# Patient Record
Sex: FEMALE | Race: WHITE | Employment: OTHER | URBAN - METROPOLITAN AREA
[De-identification: names, ages, dates, MRNs, and addresses within clinical notes are randomized per-mention and may not be internally consistent; named-entity substitution may affect disease eponyms.]

---

## 2017-08-18 ENCOUNTER — HOSPITAL ENCOUNTER (OUTPATIENT)
Dept: MAMMOGRAPHY | Age: 72
Discharge: HOME | End: 2017-08-18
Payer: MEDICARE

## 2017-08-18 DIAGNOSIS — Z12.31 SCREENING MAMMOGRAM, ENCOUNTER FOR: ICD-10-CM

## 2017-08-18 PROCEDURE — 77063 BREAST TOMOSYNTHESIS BI: CPT

## 2018-12-03 ENCOUNTER — OFFICE VISIT (OUTPATIENT)
Dept: ORTHOPEDIC SURGERY | Age: 73
End: 2018-12-03

## 2018-12-03 VITALS
HEIGHT: 65 IN | BODY MASS INDEX: 25.66 KG/M2 | WEIGHT: 154 LBS | HEART RATE: 73 BPM | SYSTOLIC BLOOD PRESSURE: 87 MMHG | DIASTOLIC BLOOD PRESSURE: 65 MMHG

## 2018-12-03 DIAGNOSIS — Z96.652 HISTORY OF TOTAL KNEE ARTHROPLASTY, LEFT: Primary | ICD-10-CM

## 2018-12-03 PROCEDURE — 99024 POSTOP FOLLOW-UP VISIT: CPT | Performed by: ORTHOPAEDIC SURGERY

## 2018-12-03 RX ORDER — ASPIRIN 81 MG/1
81 TABLET, CHEWABLE ORAL EVERY OTHER DAY
COMMUNITY

## 2018-12-03 RX ORDER — ATORVASTATIN CALCIUM 40 MG/1
40 TABLET, FILM COATED ORAL DAILY
COMMUNITY
Start: 2017-10-01

## 2018-12-03 RX ORDER — SUMATRIPTAN 100 MG/1
100 TABLET, FILM COATED ORAL
COMMUNITY
Start: 2018-08-31

## 2018-12-03 RX ORDER — OMEPRAZOLE 20 MG/1
20 CAPSULE, DELAYED RELEASE ORAL DAILY
Status: ON HOLD | COMMUNITY
Start: 2018-10-20 | End: 2020-11-03

## 2018-12-03 RX ORDER — SERTRALINE HYDROCHLORIDE 25 MG/1
100 TABLET, FILM COATED ORAL DAILY
COMMUNITY
End: 2020-09-25

## 2018-12-03 RX ORDER — LISINOPRIL AND HYDROCHLOROTHIAZIDE 20; 12.5 MG/1; MG/1
1 TABLET ORAL DAILY
COMMUNITY
Start: 2017-08-23

## 2018-12-03 ASSESSMENT — ENCOUNTER SYMPTOMS
EYES NEGATIVE: 1
GASTROINTESTINAL NEGATIVE: 1
RESPIRATORY NEGATIVE: 1

## 2019-01-09 ENCOUNTER — OFFICE VISIT (OUTPATIENT)
Dept: ORTHOPEDIC SURGERY | Age: 74
End: 2019-01-09

## 2019-01-09 VITALS
SYSTOLIC BLOOD PRESSURE: 142 MMHG | BODY MASS INDEX: 26.42 KG/M2 | DIASTOLIC BLOOD PRESSURE: 81 MMHG | HEIGHT: 65 IN | HEART RATE: 72 BPM | WEIGHT: 158.6 LBS

## 2019-01-09 DIAGNOSIS — Z96.652 HISTORY OF TOTAL KNEE ARTHROPLASTY, LEFT: Primary | ICD-10-CM

## 2019-01-09 PROCEDURE — 99024 POSTOP FOLLOW-UP VISIT: CPT | Performed by: PHYSICIAN ASSISTANT

## 2019-01-09 ASSESSMENT — ENCOUNTER SYMPTOMS
DIARRHEA: 0
ABDOMINAL DISTENTION: 0
SHORTNESS OF BREATH: 0
ABDOMINAL PAIN: 0
COLOR CHANGE: 0
VOMITING: 0
COUGH: 0
CHEST TIGHTNESS: 0
CONSTIPATION: 0
APNEA: 0
NAUSEA: 0

## 2019-02-28 ENCOUNTER — HOSPITAL ENCOUNTER (OUTPATIENT)
Dept: MAMMOGRAPHY | Age: 74
Discharge: HOME | End: 2019-03-02
Payer: MEDICARE

## 2019-02-28 DIAGNOSIS — Z12.39 BREAST CANCER SCREENING: ICD-10-CM

## 2019-02-28 DIAGNOSIS — M85.869 OSTEOPENIA OF LOWER LEG, UNSPECIFIED LATERALITY: ICD-10-CM

## 2019-02-28 PROCEDURE — 77063 BREAST TOMOSYNTHESIS BI: CPT

## 2019-02-28 PROCEDURE — 77080 DXA BONE DENSITY AXIAL: CPT

## 2020-07-18 ENCOUNTER — HOSPITAL ENCOUNTER (OUTPATIENT)
Dept: MAMMOGRAPHY | Age: 75
Discharge: HOME | End: 2020-07-20
Payer: MEDICARE

## 2020-07-18 PROCEDURE — 77067 SCR MAMMO BI INCL CAD: CPT

## 2020-07-30 ENCOUNTER — HOSPITAL ENCOUNTER (OUTPATIENT)
Dept: MAMMOGRAPHY | Age: 75
Discharge: HOME | End: 2020-08-01
Payer: MEDICARE

## 2020-07-30 ENCOUNTER — HOSPITAL ENCOUNTER (OUTPATIENT)
Dept: ULTRASOUND IMAGING | Age: 75
Discharge: HOME | End: 2020-08-01
Payer: MEDICARE

## 2020-07-30 PROCEDURE — 76642 ULTRASOUND BREAST LIMITED: CPT

## 2020-07-30 PROCEDURE — 77065 DX MAMMO INCL CAD UNI: CPT

## 2020-08-06 ENCOUNTER — HOSPITAL ENCOUNTER (OUTPATIENT)
Dept: ULTRASOUND IMAGING | Age: 75
Discharge: HOME | End: 2020-08-08
Payer: MEDICARE

## 2020-08-06 ENCOUNTER — HOSPITAL ENCOUNTER (OUTPATIENT)
Dept: MAMMOGRAPHY | Age: 75
Discharge: HOME | End: 2020-08-08
Payer: MEDICARE

## 2020-08-06 VITALS — DIASTOLIC BLOOD PRESSURE: 75 MMHG | HEART RATE: 64 BPM | SYSTOLIC BLOOD PRESSURE: 137 MMHG

## 2020-08-06 PROCEDURE — 88305 TISSUE EXAM BY PATHOLOGIST: CPT

## 2020-08-06 PROCEDURE — 88341 IMHCHEM/IMCYTCHM EA ADD ANTB: CPT

## 2020-08-06 PROCEDURE — 88360 TUMOR IMMUNOHISTOCHEM/MANUAL: CPT

## 2020-08-06 PROCEDURE — 19084 BX BREAST ADD LESION US IMAG: CPT

## 2020-08-06 PROCEDURE — A4648 IMPLANTABLE TISSUE MARKER: HCPCS

## 2020-08-06 PROCEDURE — 19083 BX BREAST 1ST LESION US IMAG: CPT

## 2020-08-06 PROCEDURE — 88342 IMHCHEM/IMCYTCHM 1ST ANTB: CPT

## 2020-08-06 PROCEDURE — 88377 M/PHMTRC ALYS ISHQUANT/SEMIQ: CPT

## 2020-08-06 PROCEDURE — 2500000003 HC RX 250 WO HCPCS

## 2020-08-11 LAB — SURGICAL PATHOLOGY REPORT: NORMAL

## 2020-08-13 ENCOUNTER — TELEPHONE (OUTPATIENT)
Dept: ONCOLOGY | Age: 75
End: 2020-08-13

## 2020-08-13 NOTE — TELEPHONE ENCOUNTER
Notified of patient to navigate per pathology list.  Contacted Dr. Alfonso Gutiérrez office and patient does not have follow up with Dr. Rosalba Salgado at this time for results. Dr. Alfonso Gutiérrez office does have pathology report. Pt has been referred to Dr. Tip Montanez. I spoke with Dr. Mckay Washburn office and patient is on the schedule to be seen 9/3/20. Navigation letter faxed to Dr. Alfonso Gutiérrez office, Navigation letter sent per epic to Dr. Tip Montanez. I let staff know that I am available to assist Dr. Tip Montanez and patient as needed. I will reach out to patient after her appointment with Dr. Tip Montanez next week.

## 2020-09-15 ENCOUNTER — TELEPHONE (OUTPATIENT)
Dept: ONCOLOGY | Age: 75
End: 2020-09-15

## 2020-09-15 NOTE — TELEPHONE ENCOUNTER
Patient has had consultation with surgeon and is scheduled for left breast lumpectomy with [facility] for 10/6/20. I called Oscar Vines and left message introducing myself as her oncology nurse navigator. Explained that I am here as a resource, support person, coordinate care as needed, answer questions and help remove any barriers to care. I left her my name and contact number. I encouraged her to call as needed. Let her know I will follow along with her care. Navigation letter mailed out to Oscar Vines. Pt on pending.

## 2020-09-21 ENCOUNTER — TELEPHONE (OUTPATIENT)
Dept: ONCOLOGY | Age: 75
End: 2020-09-21

## 2020-09-21 NOTE — TELEPHONE ENCOUNTER
I called and spoke with Igor Or today. I introduced myself as her oncology nurse navigator at [facility] ([facility]). She relates that she received my information and my call. I let her know I am here to provide assistance and support for her through her treatment. I am here to help coordinate appointments, answer questions, talk about concerns or road blocks to care. Confirmed with her my contact number. I asked if she has any questions about upcoming surgery 10/9/20. She relates she is ready to get it done, and she does not know what she should ask. We talked and she ended up asking some very good questions. We talked about radiation therapy. I explained that she will be referred to a radiation oncologist.  Michelle Sings many questions about regarding xrt. We talked about when it would start, frequency, length of time, side effects, ways to reduce side effects. I let her know that she may also see a medical oncologist due to her er/pr+ status. Explained that when a patient has er/pr+ status one tool for treatment/prevention of another cancer is endocrine therapy. She would be a candidate for endocrine therapy. I let her know that after her surgery she will follow up with her surgeon and he will refer her to radiation oncologist for radiation therapy discussion, and medical oncologist for endocrine therapy/ chemotherapy. Explained that sometimes chemotherapy is needed but not for everyone. I let her know if there is reason for chemotherapy her medical oncologist would talk to her about that. We discussed Deep inspiratory breath hold technique that may be a benefit for left sided breast cancer patients when they get radiation therapy. We discussed post op recovery. I offered to send her [facility] booklets on radiation therapy and breast conserving surgery and she was appreciative of the offer and would like to see them. Mailed booklets. Pt on pending.

## 2020-09-25 ENCOUNTER — HOSPITAL ENCOUNTER (OUTPATIENT)
Dept: PREADMISSION TESTING | Age: 75
Discharge: HOME | End: 2020-09-29
Payer: MEDICARE

## 2020-09-25 VITALS
DIASTOLIC BLOOD PRESSURE: 82 MMHG | OXYGEN SATURATION: 97 % | RESPIRATION RATE: 18 BRPM | SYSTOLIC BLOOD PRESSURE: 152 MMHG | HEIGHT: 66 IN | WEIGHT: 174.16 LBS | TEMPERATURE: 97.5 F | HEART RATE: 77 BPM | BODY MASS INDEX: 27.99 KG/M2

## 2020-09-25 LAB
ABSOLUTE EOS #: 0.24 K/UL (ref 0–0.44)
ABSOLUTE IMMATURE GRANULOCYTE: 0.04 K/UL (ref 0–0.3)
ABSOLUTE LYMPH #: 1.63 K/UL (ref 1.1–3.7)
ABSOLUTE MONO #: 0.61 K/UL (ref 0.1–1.2)
ANION GAP SERPL CALCULATED.3IONS-SCNC: 11 MMOL/L (ref 9–17)
BASOPHILS # BLD: 1 % (ref 0–2)
BASOPHILS ABSOLUTE: 0.1 K/UL (ref 0–0.2)
BUN BLDV-MCNC: 16 MG/DL (ref 8–23)
CHLORIDE BLD-SCNC: 105 MMOL/L (ref 98–107)
CO2: 24 MMOL/L (ref 20–31)
CREAT SERPL-MCNC: 0.96 MG/DL (ref 0.5–0.9)
DIFFERENTIAL TYPE: ABNORMAL
EOSINOPHILS RELATIVE PERCENT: 3 % (ref 1–4)
GFR AFRICAN AMERICAN: >60 ML/MIN
GFR NON-AFRICAN AMERICAN: 57 ML/MIN
GFR SERPL CREATININE-BSD FRML MDRD: ABNORMAL ML/MIN/{1.73_M2}
GFR SERPL CREATININE-BSD FRML MDRD: ABNORMAL ML/MIN/{1.73_M2}
HCT VFR BLD CALC: 42 % (ref 36.3–47.1)
HEMOGLOBIN: 12.9 G/DL (ref 11.9–15.1)
IMMATURE GRANULOCYTES: 0 %
LYMPHOCYTES # BLD: 17 % (ref 24–43)
MCH RBC QN AUTO: 29.5 PG (ref 25.2–33.5)
MCHC RBC AUTO-ENTMCNC: 30.7 G/DL (ref 28.4–34.8)
MCV RBC AUTO: 96.1 FL (ref 82.6–102.9)
MONOCYTES # BLD: 6 % (ref 3–12)
NRBC AUTOMATED: 0 PER 100 WBC
PDW BLD-RTO: 14.4 % (ref 11.8–14.4)
PLATELET # BLD: 261 K/UL (ref 138–453)
PLATELET ESTIMATE: ABNORMAL
PMV BLD AUTO: 10.2 FL (ref 8.1–13.5)
POTASSIUM SERPL-SCNC: 3.9 MMOL/L (ref 3.7–5.3)
RBC # BLD: 4.37 M/UL (ref 3.95–5.11)
RBC # BLD: ABNORMAL 10*6/UL
SEG NEUTROPHILS: 73 % (ref 36–65)
SEGMENTED NEUTROPHILS ABSOLUTE COUNT: 6.86 K/UL (ref 1.5–8.1)
SODIUM BLD-SCNC: 140 MMOL/L (ref 135–144)
WBC # BLD: 9.5 K/UL (ref 3.5–11.3)
WBC # BLD: ABNORMAL 10*3/UL

## 2020-09-25 PROCEDURE — 93005 ELECTROCARDIOGRAM TRACING: CPT

## 2020-09-25 PROCEDURE — 36415 COLL VENOUS BLD VENIPUNCTURE: CPT

## 2020-09-25 PROCEDURE — 80051 ELECTROLYTE PANEL: CPT

## 2020-09-25 PROCEDURE — 85025 COMPLETE CBC W/AUTO DIFF WBC: CPT

## 2020-09-25 PROCEDURE — 82565 ASSAY OF CREATININE: CPT

## 2020-09-25 PROCEDURE — 84520 ASSAY OF UREA NITROGEN: CPT

## 2020-09-25 RX ORDER — PROPRANOLOL HYDROCHLORIDE 160 MG/1
160 CAPSULE, EXTENDED RELEASE ORAL DAILY
COMMUNITY

## 2020-09-25 RX ORDER — SERTRALINE HYDROCHLORIDE 100 MG/1
100 TABLET, FILM COATED ORAL DAILY
COMMUNITY

## 2020-09-25 RX ORDER — CEFAZOLIN SODIUM 2 G/50ML
2 SOLUTION INTRAVENOUS ONCE
Status: CANCELLED | OUTPATIENT
Start: 2020-10-06

## 2020-09-25 NOTE — PROGRESS NOTES
PAT Progress Note    Pt Name: Nereida Dorman  MRN: [id number]  YOB: 1945  Date of evaluation: 9/25/2020      [x] Called to PAT. I spoke to Nereida Dorman a 66 y.o. female who is scheduled for a left breast needle localization at 0800 lumpectomy with left sentinel node biopsy at 0900, possible axillary lymph node dissection by Dr. Ana Luisa Mejia on 10/06/2020 at 1000 due to left breast cancer. I reviewed the office note sent by hard copy and placed in the short chart dated 09/10/2020 by Dr. Ana Luisa Mejia. This meets the criteria for an interval History and Physical and will be updated with a note on the day of the patient's surgery. History of hypertension, hyperlipidemia, cardiac murmur, LBBB, PVCs, and heart palpitations. Pt is alert and oriented without apparent distress. She denies chest pain, dyspnea, and dizziness. The pt states she has an appointment with a cardiologist at [facility] in October 2020 after surgery for her heart murmur. An EKG was completed today (See Epic). Functional Capacity per pt:   1) Pt is able to walk 2 city blocks on level ground without SOB. 2) Pt is able to climb 2 flights of stairs without SOB. 3) Pt is not able to walk up a hill for 1-2 city blocks without SOB.       JANET Ibanez  Electronically signed 9/25/2020 at 12:05 PM

## 2020-09-28 LAB
EKG ATRIAL RATE: 72 BPM
EKG P AXIS: 63 DEGREES
EKG P-R INTERVAL: 148 MS
EKG Q-T INTERVAL: 446 MS
EKG QRS DURATION: 142 MS
EKG QTC CALCULATION (BAZETT): 488 MS
EKG R AXIS: -6 DEGREES
EKG T AXIS: 93 DEGREES
EKG VENTRICULAR RATE: 72 BPM

## 2020-10-02 ENCOUNTER — HOSPITAL ENCOUNTER (OUTPATIENT)
Dept: PREADMISSION TESTING | Age: 75
Setting detail: SPECIMEN
Discharge: HOME | End: 2020-10-06
Payer: MEDICARE

## 2020-10-02 PROCEDURE — U0003 INFECTIOUS AGENT DETECTION BY NUCLEIC ACID (DNA OR RNA); SEVERE ACUTE RESPIRATORY SYNDROME CORONAVIRUS 2 (SARS-COV-2) (CORONAVIRUS DISEASE [COVID-19]), AMPLIFIED PROBE TECHNIQUE, MAKING USE OF HIGH THROUGHPUT TECHNOLOGIES AS DESCRIBED BY CMS-2020-01-R: HCPCS

## 2020-10-04 LAB — SARS-COV-2, NAA: NOT DETECTED

## 2020-10-05 ENCOUNTER — TELEPHONE (OUTPATIENT)
Dept: PRIMARY CARE CLINIC | Age: 75
End: 2020-10-05

## 2020-10-06 ENCOUNTER — APPOINTMENT (OUTPATIENT)
Dept: MAMMOGRAPHY | Age: 75
End: 2020-10-06
Attending: SURGERY
Payer: MEDICARE

## 2020-10-06 ENCOUNTER — ANESTHESIA (OUTPATIENT)
Dept: OPERATING ROOM | Age: 75
End: 2020-10-06
Payer: MEDICARE

## 2020-10-06 ENCOUNTER — HOSPITAL ENCOUNTER (OUTPATIENT)
Dept: NUCLEAR MEDICINE | Age: 75
Discharge: HOME | End: 2020-10-08
Payer: MEDICARE

## 2020-10-06 ENCOUNTER — HOSPITAL ENCOUNTER (OUTPATIENT)
Age: 75
Setting detail: OUTPATIENT SURGERY
Discharge: HOME | End: 2020-10-06
Attending: SURGERY | Admitting: SURGERY
Payer: MEDICARE

## 2020-10-06 ENCOUNTER — HOSPITAL ENCOUNTER (OUTPATIENT)
Dept: ULTRASOUND IMAGING | Age: 75
Discharge: HOME | End: 2020-10-08
Payer: MEDICARE

## 2020-10-06 ENCOUNTER — ANESTHESIA EVENT (OUTPATIENT)
Dept: OPERATING ROOM | Age: 75
End: 2020-10-06
Payer: MEDICARE

## 2020-10-06 VITALS
HEIGHT: 66 IN | RESPIRATION RATE: 13 BRPM | WEIGHT: 174.16 LBS | TEMPERATURE: 96.8 F | HEART RATE: 62 BPM | SYSTOLIC BLOOD PRESSURE: 145 MMHG | BODY MASS INDEX: 27.99 KG/M2 | DIASTOLIC BLOOD PRESSURE: 69 MMHG | OXYGEN SATURATION: 96 %

## 2020-10-06 VITALS — OXYGEN SATURATION: 100 % | DIASTOLIC BLOOD PRESSURE: 97 MMHG | TEMPERATURE: 96.4 F | SYSTOLIC BLOOD PRESSURE: 175 MMHG

## 2020-10-06 PROCEDURE — 77065 DX MAMMO INCL CAD UNI: CPT

## 2020-10-06 PROCEDURE — 88305 TISSUE EXAM BY PATHOLOGIST: CPT

## 2020-10-06 PROCEDURE — 88307 TISSUE EXAM BY PATHOLOGIST: CPT

## 2020-10-06 PROCEDURE — 3700000000 HC ANESTHESIA ATTENDED CARE: Performed by: SURGERY

## 2020-10-06 PROCEDURE — A4648 IMPLANTABLE TISSUE MARKER: HCPCS

## 2020-10-06 PROCEDURE — 6360000002 HC RX W HCPCS: Performed by: NURSE ANESTHETIST, CERTIFIED REGISTERED

## 2020-10-06 PROCEDURE — 2500000003 HC RX 250 WO HCPCS: Performed by: SURGERY

## 2020-10-06 PROCEDURE — A9520 TC99 TILMANOCEPT DIAG 0.5MCI: HCPCS

## 2020-10-06 PROCEDURE — 3430000000 HC RX DIAGNOSTIC RADIOPHARMACEUTICAL: Performed by: SURGERY

## 2020-10-06 PROCEDURE — 76098 X-RAY EXAM SURGICAL SPECIMEN: CPT

## 2020-10-06 PROCEDURE — 7100000001 HC PACU RECOVERY - ADDTL 15 MIN: Performed by: SURGERY

## 2020-10-06 PROCEDURE — 7100000010 HC PHASE II RECOVERY - FIRST 15 MIN: Performed by: SURGERY

## 2020-10-06 PROCEDURE — 7100000011 HC PHASE II RECOVERY - ADDTL 15 MIN: Performed by: SURGERY

## 2020-10-06 PROCEDURE — 2500000003 HC RX 250 WO HCPCS: Performed by: NURSE ANESTHETIST, CERTIFIED REGISTERED

## 2020-10-06 PROCEDURE — 6370000000 HC RX 637 (ALT 250 FOR IP): Performed by: INTERNAL MEDICINE

## 2020-10-06 PROCEDURE — 3600000012 HC SURGERY LEVEL 2 ADDTL 15MIN: Performed by: SURGERY

## 2020-10-06 PROCEDURE — 2580000003 HC RX 258: Performed by: ANESTHESIOLOGY

## 2020-10-06 PROCEDURE — 2709999900 HC NON-CHARGEABLE SUPPLY: Performed by: SURGERY

## 2020-10-06 PROCEDURE — 3430000000 HC RX DIAGNOSTIC RADIOPHARMACEUTICAL

## 2020-10-06 PROCEDURE — 7100000000 HC PACU RECOVERY - FIRST 15 MIN: Performed by: SURGERY

## 2020-10-06 PROCEDURE — 6360000002 HC RX W HCPCS: Performed by: SURGERY

## 2020-10-06 PROCEDURE — 78195 LYMPH SYSTEM IMAGING: CPT

## 2020-10-06 PROCEDURE — A9520 TC99 TILMANOCEPT DIAG 0.5MCI: HCPCS | Performed by: SURGERY

## 2020-10-06 PROCEDURE — 3600000002 HC SURGERY LEVEL 2 BASE: Performed by: SURGERY

## 2020-10-06 PROCEDURE — 19285 PERQ DEV BREAST 1ST US IMAG: CPT

## 2020-10-06 PROCEDURE — 3700000001 HC ADD 15 MINUTES (ANESTHESIA): Performed by: SURGERY

## 2020-10-06 PROCEDURE — 2500000003 HC RX 250 WO HCPCS

## 2020-10-06 RX ORDER — SODIUM CHLORIDE 9 MG/ML
INJECTION, SOLUTION INTRAVENOUS CONTINUOUS
Status: DISCONTINUED | OUTPATIENT
Start: 2020-10-06 | End: 2020-10-07 | Stop reason: HOSPADM

## 2020-10-06 RX ORDER — PROPOFOL 10 MG/ML
INJECTION, EMULSION INTRAVENOUS PRN
Status: DISCONTINUED | OUTPATIENT
Start: 2020-10-06 | End: 2020-10-06 | Stop reason: SDUPTHER

## 2020-10-06 RX ORDER — ONDANSETRON 2 MG/ML
INJECTION INTRAMUSCULAR; INTRAVENOUS PRN
Status: DISCONTINUED | OUTPATIENT
Start: 2020-10-06 | End: 2020-10-06 | Stop reason: SDUPTHER

## 2020-10-06 RX ORDER — HYDROCODONE BITARTRATE AND ACETAMINOPHEN 5; 325 MG/1; MG/1
1 TABLET ORAL EVERY 6 HOURS PRN
Qty: 28 TABLET | Refills: 0 | Status: SHIPPED | OUTPATIENT
Start: 2020-10-06 | End: 2020-10-13

## 2020-10-06 RX ORDER — LIDOCAINE HYDROCHLORIDE 10 MG/ML
1 INJECTION, SOLUTION EPIDURAL; INFILTRATION; INTRACAUDAL; PERINEURAL
Status: DISCONTINUED | OUTPATIENT
Start: 2020-10-06 | End: 2020-10-06 | Stop reason: HOSPADM

## 2020-10-06 RX ORDER — SODIUM CHLORIDE 0.9 % (FLUSH) 0.9 %
10 SYRINGE (ML) INJECTION EVERY 12 HOURS SCHEDULED
Status: DISCONTINUED | OUTPATIENT
Start: 2020-10-06 | End: 2020-10-07 | Stop reason: HOSPADM

## 2020-10-06 RX ORDER — KETAMINE HCL IN NACL, ISO-OSM 100MG/10ML
SYRINGE (ML) INJECTION PRN
Status: DISCONTINUED | OUTPATIENT
Start: 2020-10-06 | End: 2020-10-06 | Stop reason: SDUPTHER

## 2020-10-06 RX ORDER — OXYCODONE HYDROCHLORIDE AND ACETAMINOPHEN 5; 325 MG/1; MG/1
1 TABLET ORAL
Status: DISCONTINUED | OUTPATIENT
Start: 2020-10-06 | End: 2020-10-06 | Stop reason: HOSPADM

## 2020-10-06 RX ORDER — ROCURONIUM BROMIDE 10 MG/ML
INJECTION, SOLUTION INTRAVENOUS PRN
Status: DISCONTINUED | OUTPATIENT
Start: 2020-10-06 | End: 2020-10-06 | Stop reason: SDUPTHER

## 2020-10-06 RX ORDER — FENTANYL CITRATE 50 UG/ML
INJECTION, SOLUTION INTRAMUSCULAR; INTRAVENOUS PRN
Status: DISCONTINUED | OUTPATIENT
Start: 2020-10-06 | End: 2020-10-06 | Stop reason: SDUPTHER

## 2020-10-06 RX ORDER — ONDANSETRON 4 MG/1
4 TABLET, FILM COATED ORAL EVERY 8 HOURS PRN
Qty: 20 TABLET | Refills: 0 | Status: ON HOLD | OUTPATIENT
Start: 2020-10-06 | End: 2020-11-03

## 2020-10-06 RX ORDER — MIDAZOLAM HYDROCHLORIDE 1 MG/ML
INJECTION INTRAMUSCULAR; INTRAVENOUS PRN
Status: DISCONTINUED | OUTPATIENT
Start: 2020-10-06 | End: 2020-10-06 | Stop reason: SDUPTHER

## 2020-10-06 RX ORDER — DEXAMETHASONE SODIUM PHOSPHATE 10 MG/ML
INJECTION, SOLUTION INTRAMUSCULAR; INTRAVENOUS PRN
Status: DISCONTINUED | OUTPATIENT
Start: 2020-10-06 | End: 2020-10-06 | Stop reason: SDUPTHER

## 2020-10-06 RX ORDER — FENTANYL CITRATE 50 UG/ML
25 INJECTION, SOLUTION INTRAMUSCULAR; INTRAVENOUS EVERY 5 MIN PRN
Status: DISCONTINUED | OUTPATIENT
Start: 2020-10-06 | End: 2020-10-07 | Stop reason: HOSPADM

## 2020-10-06 RX ORDER — LIDOCAINE HYDROCHLORIDE 20 MG/ML
INJECTION, SOLUTION EPIDURAL; INFILTRATION; INTRACAUDAL; PERINEURAL PRN
Status: DISCONTINUED | OUTPATIENT
Start: 2020-10-06 | End: 2020-10-06 | Stop reason: SDUPTHER

## 2020-10-06 RX ORDER — LIDOCAINE 40 MG/G
CREAM TOPICAL
Status: COMPLETED | OUTPATIENT
Start: 2020-10-06 | End: 2020-10-06

## 2020-10-06 RX ORDER — SODIUM CHLORIDE, SODIUM LACTATE, POTASSIUM CHLORIDE, CALCIUM CHLORIDE 600; 310; 30; 20 MG/100ML; MG/100ML; MG/100ML; MG/100ML
INJECTION, SOLUTION INTRAVENOUS CONTINUOUS
Status: DISCONTINUED | OUTPATIENT
Start: 2020-10-06 | End: 2020-10-07 | Stop reason: HOSPADM

## 2020-10-06 RX ORDER — LIDOCAINE 40 MG/G
CREAM TOPICAL
Status: CANCELLED | OUTPATIENT
Start: 2020-10-06

## 2020-10-06 RX ORDER — SODIUM CHLORIDE 0.9 % (FLUSH) 0.9 %
10 SYRINGE (ML) INJECTION PRN
Status: DISCONTINUED | OUTPATIENT
Start: 2020-10-06 | End: 2020-10-07 | Stop reason: HOSPADM

## 2020-10-06 RX ORDER — CEFAZOLIN SODIUM 2 G/50ML
2 SOLUTION INTRAVENOUS ONCE
Status: COMPLETED | OUTPATIENT
Start: 2020-10-06 | End: 2020-10-06

## 2020-10-06 RX ORDER — FENTANYL CITRATE 50 UG/ML
50 INJECTION, SOLUTION INTRAMUSCULAR; INTRAVENOUS EVERY 5 MIN PRN
Status: DISCONTINUED | OUTPATIENT
Start: 2020-10-06 | End: 2020-10-07 | Stop reason: HOSPADM

## 2020-10-06 RX ORDER — SENNA AND DOCUSATE SODIUM 50; 8.6 MG/1; MG/1
1 TABLET, FILM COATED ORAL DAILY
Qty: 30 TABLET | Refills: 0 | Status: ON HOLD | OUTPATIENT
Start: 2020-10-06 | End: 2020-11-03

## 2020-10-06 RX ORDER — ONDANSETRON 2 MG/ML
4 INJECTION INTRAMUSCULAR; INTRAVENOUS
Status: DISCONTINUED | OUTPATIENT
Start: 2020-10-06 | End: 2020-10-06 | Stop reason: HOSPADM

## 2020-10-06 RX ADMIN — CEFAZOLIN SODIUM 2 G: 2 SOLUTION INTRAVENOUS at 10:20

## 2020-10-06 RX ADMIN — Medication 30 MG: at 10:14

## 2020-10-06 RX ADMIN — LIDOCAINE 4%: 4 CREAM TOPICAL at 07:38

## 2020-10-06 RX ADMIN — DEXAMETHASONE SODIUM PHOSPHATE 10 MG: 10 INJECTION INTRAMUSCULAR; INTRAVENOUS at 10:20

## 2020-10-06 RX ADMIN — ROCURONIUM BROMIDE 50 MG: 10 INJECTION, SOLUTION INTRAVENOUS at 10:14

## 2020-10-06 RX ADMIN — TILMANOCEPT 0.6 MILLICURIE: KIT at 09:35

## 2020-10-06 RX ADMIN — SUGAMMADEX 200 MG: 100 INJECTION, SOLUTION INTRAVENOUS at 11:08

## 2020-10-06 RX ADMIN — LIDOCAINE HYDROCHLORIDE 80 MG: 20 INJECTION, SOLUTION EPIDURAL; INFILTRATION; INTRACAUDAL; PERINEURAL at 10:14

## 2020-10-06 RX ADMIN — Medication 50 MCG: at 10:14

## 2020-10-06 RX ADMIN — PROPOFOL 50 MG: 10 INJECTION, EMULSION INTRAVENOUS at 11:42

## 2020-10-06 RX ADMIN — ONDANSETRON 4 MG: 2 INJECTION, SOLUTION INTRAMUSCULAR; INTRAVENOUS at 10:20

## 2020-10-06 RX ADMIN — PROPOFOL 150 MG: 10 INJECTION, EMULSION INTRAVENOUS at 10:14

## 2020-10-06 RX ADMIN — Medication 50 MCG: at 10:29

## 2020-10-06 RX ADMIN — Medication 10 MG: at 11:20

## 2020-10-06 RX ADMIN — MIDAZOLAM 2 MG: 1 INJECTION INTRAMUSCULAR; INTRAVENOUS at 10:09

## 2020-10-06 RX ADMIN — SODIUM CHLORIDE, POTASSIUM CHLORIDE, SODIUM LACTATE AND CALCIUM CHLORIDE: 600; 310; 30; 20 INJECTION, SOLUTION INTRAVENOUS at 07:35

## 2020-10-06 ASSESSMENT — PULMONARY FUNCTION TESTS
PIF_VALUE: 15
PIF_VALUE: 15
PIF_VALUE: 14
PIF_VALUE: 15
PIF_VALUE: 14
PIF_VALUE: 16
PIF_VALUE: 1
PIF_VALUE: 15
PIF_VALUE: 21
PIF_VALUE: 3
PIF_VALUE: 15
PIF_VALUE: 14
PIF_VALUE: 16
PIF_VALUE: 14
PIF_VALUE: 16
PIF_VALUE: 15
PIF_VALUE: 14
PIF_VALUE: 14
PIF_VALUE: 19
PIF_VALUE: 14
PIF_VALUE: 14
PIF_VALUE: 15
PIF_VALUE: 17
PIF_VALUE: 14
PIF_VALUE: 15
PIF_VALUE: 14
PIF_VALUE: 15
PIF_VALUE: 16
PIF_VALUE: 15
PIF_VALUE: 16
PIF_VALUE: 2
PIF_VALUE: 14
PIF_VALUE: 1
PIF_VALUE: 13
PIF_VALUE: 6
PIF_VALUE: 14
PIF_VALUE: 3
PIF_VALUE: 14
PIF_VALUE: 16
PIF_VALUE: 14
PIF_VALUE: 1
PIF_VALUE: 15
PIF_VALUE: 14
PIF_VALUE: 15
PIF_VALUE: 1
PIF_VALUE: 14
PIF_VALUE: 15
PIF_VALUE: 15
PIF_VALUE: 11
PIF_VALUE: 1
PIF_VALUE: 15
PIF_VALUE: 14
PIF_VALUE: 15
PIF_VALUE: 14
PIF_VALUE: 16
PIF_VALUE: 14
PIF_VALUE: 15
PIF_VALUE: 20
PIF_VALUE: 16
PIF_VALUE: 2
PIF_VALUE: 13
PIF_VALUE: 15
PIF_VALUE: 16
PIF_VALUE: 15
PIF_VALUE: 15
PIF_VALUE: 16
PIF_VALUE: 14
PIF_VALUE: 16
PIF_VALUE: 15
PIF_VALUE: 14
PIF_VALUE: 0
PIF_VALUE: 16
PIF_VALUE: 15
PIF_VALUE: 14
PIF_VALUE: 14
PIF_VALUE: 16
PIF_VALUE: 1
PIF_VALUE: 15
PIF_VALUE: 14
PIF_VALUE: 16
PIF_VALUE: 2
PIF_VALUE: 15
PIF_VALUE: 14
PIF_VALUE: 15
PIF_VALUE: 14
PIF_VALUE: 15
PIF_VALUE: 25

## 2020-10-06 ASSESSMENT — PAIN SCALES - GENERAL
PAINLEVEL_OUTOF10: 0

## 2020-10-06 ASSESSMENT — ENCOUNTER SYMPTOMS: SHORTNESS OF BREATH: 0

## 2020-10-06 ASSESSMENT — PAIN - FUNCTIONAL ASSESSMENT: PAIN_FUNCTIONAL_ASSESSMENT: 0-10

## 2020-10-06 NOTE — ANESTHESIA POSTPROCEDURE EVALUATION
Department of Anesthesiology  Postprocedure Note    Patient: Ayana Martin  MRN: [id number]  YOB: 1945  Date of evaluation: 10/6/2020  Time:  1:19 PM     Procedure Summary     Date:  10/06/20 Room / Location:  [address] [place] [address] / [facility] - INPATIENT    Anesthesia Start:  1009 Anesthesia Stop:  2430    Procedure:  LEFT UPPER OUTER QUADRANT LUMPECTOMY. WITH SENTINEL LYMPH NODE BIOPSY,  AXILLARY LYMPH NODE DISSECTION (Left Breast) Diagnosis:  (DX LEFT BR CA)    Surgeon:  Ye Cohen MD Responsible Provider:  Jaylen Oliveira MD    Anesthesia Type:  general ASA Status:  2          Anesthesia Type: general    Harjinder Phase I: Harjinder Score: 8    Harjinder Phase II:      Last vitals: Reviewed and per EMR flowsheets.        Anesthesia Post Evaluation    Complications: no

## 2020-10-06 NOTE — ANESTHESIA PRE PROCEDURE
Department of Anesthesiology  Preprocedure Note       Name:  Kaz Rendon   Age:  66 y.o.  :  1945                                          MRN:  [id number]         Date:  10/6/2020      Surgeon: Nia Morris):  Sunitha Malone MD    Procedure: Procedure(s):  LEFT BREAST  NEEDLE LOC( @  8AM ) LUMPECTOMY WITH LEFT SENTINEL NODE BIOPSY (@ 9AM)       POSSIBLE AXILLARY LYMPH NODE DISSECTION    Medications prior to admission:   Prior to Admission medications    Medication Sig Start Date End Date Taking? Authorizing Provider   HYDROcodone-acetaminophen (NORCO) 5-325 MG per tablet Take 1 tablet by mouth every 6 hours as needed for Pain for up to 7 days. Intended supply: 7 days.  Take lowest dose possible to manage pain 10/6/20 10/13/20 Yes Ivana Kyle DO   sennosides-docusate sodium (SENOKOT-S) 8.6-50 MG tablet Take 1 tablet by mouth daily 10/6/20  Yes Ivana Kyle DO   ondansetron (ZOFRAN) 4 MG tablet Take 1 tablet by mouth every 8 hours as needed for Nausea or Vomiting 10/6/20  Yes Ivana Kyle DO   propranolol (INDERAL LA) 160 MG extended release capsule Take 160 mg by mouth daily   Yes Historical Provider, MD   sertraline (ZOLOFT) 100 MG tablet Take 100 mg by mouth daily   Yes Historical Provider, MD   atorvastatin (LIPITOR) 40 MG tablet Take 40 mg by mouth daily  10/1/17  Yes Historical Provider, MD   lisinopril-hydrochlorothiazide (PRINZIDE;ZESTORETIC) 20-12.5 MG per tablet Take 1 tablet by mouth daily  17  Yes Historical Provider, MD   omeprazole (PRILOSEC) 20 MG delayed release capsule Take 20 mg by mouth Daily  10/20/18  Yes Historical Provider, MD   SUMAtriptan (IMITREX) 100 MG tablet Take 100 mg by mouth once as needed  18  Yes Historical Provider, MD   aspirin 81 MG chewable tablet Take 81 mg by mouth every other day     Historical Provider, MD       Current medications:    Current Facility-Administered Medications   Medication Dose Route Frequency Provider Last Rate Last Dose    0.9 % sodium chloride infusion   Intravenous Continuous Ndal Jesse Jaramillo MD        lactated ringers infusion   Intravenous Continuous Herminia Byers  mL/hr at 10/06/20 0735      sodium chloride flush 0.9 % injection 10 mL  10 mL Intravenous 2 times per day Herminia Byers MD        sodium chloride flush 0.9 % injection 10 mL  10 mL Intravenous PRN Herminia Byers MD        lidocaine PF 1 % injection 1 mL  1 mL Intradermal Once PRN Herminia Byers MD        ceFAZolin (ANCEF) 2 g in dextrose 3 % 50 mL IVPB (duplex)  2 g Intravenous Once Tia Valencia MD           Allergies:  No Known Allergies    Problem List:  There is no problem list on file for this patient. Past Medical History:        Diagnosis Date    Arthritis     Cancer [facility])     breast    Cardiac murmur     Depression     GERD (gastroesophageal reflux disease)     Heart palpitations     with anxiety    Hyperlipidemia     Hypertension     LBBB (left bundle branch block)     Per the pt's medical record.  Migraine     PVC (premature ventricular contraction)     per the pt's medical record       Past Surgical History:        Procedure Laterality Date     SECTION      x 2     COLONOSCOPY      JOINT REPLACEMENT Bilateral     knee    US BREAST BIOPSY NEEDLE ADDITIONAL LEFT  2020    US BREAST BIOPSY NEEDLE ADDITIONAL LEFT 2020 [facility]    US BREAST NEEDLE BIOPSY LEFT  2020    US BREAST NEEDLE BIOPSY LEFT 2020 [facility]       Social History:    Social History     Tobacco Use    Smoking status: Former Smoker    Smokeless tobacco: Never Used   Substance Use Topics    Alcohol use:  Yes     Alcohol/week: 7.0 standard drinks     Types: 7 Glasses of wine per week                                Counseling given: Not Answered      Vital Signs (Current):   Vitals:    10/06/20 0719 10/06/20 0723   BP: (!) 152/87    Pulse: 80    Resp: 18    Temp: 96.8 °F (36 °C)    SpO2: 97%    Weight:  174 lb 2.6 oz (79 kg)   Height:  5' 6\" (1.676 m)                                              BP Readings from Last 3 Encounters:   10/06/20 (!) 152/87   09/25/20 (!) 152/82   08/06/20 137/75       NPO Status: Time of last liquid consumption: 2330                        Time of last solid consumption: 2200                        Date of last liquid consumption: 10/05/20                        Date of last solid food consumption: 10/05/20    BMI:   Wt Readings from Last 3 Encounters:   10/06/20 174 lb 2.6 oz (79 kg)   09/25/20 174 lb 2.6 oz (79 kg)   01/09/19 158 lb 9.6 oz (71.9 kg)     Body mass index is 28.11 kg/m². CBC:   Lab Results   Component Value Date    WBC 9.5 09/25/2020    RBC 4.37 09/25/2020    HGB 12.9 09/25/2020    HCT 42.0 09/25/2020    MCV 96.1 09/25/2020    RDW 14.4 09/25/2020     09/25/2020       CMP:   Lab Results   Component Value Date     09/25/2020    K 3.9 09/25/2020     09/25/2020    CO2 24 09/25/2020    BUN 16 09/25/2020    CREATININE 0.96 09/25/2020    GFRAA >60 09/25/2020    LABGLOM 57 09/25/2020    GLUCOSE 107 10/19/2015    GLUCOSE 103 06/01/2012    CALCIUM 9.1 10/19/2015    AST 18 04/15/2013    ALT 21 04/15/2013       POC Tests: No results for input(s): POCGLU, POCNA, POCK, POCCL, POCBUN, POCHEMO, POCHCT in the last 72 hours.     Coags: No results found for: PROTIME, INR, APTT    HCG (If Applicable): No results found for: PREGTESTUR, PREGSERUM, HCG, HCGQUANT     ABGs: No results found for: PHART, PO2ART, OCC7QIA, EWF2FXQ, BEART, U0YGGUTY     Type & Screen (If Applicable):  No results found for: LABABO, LABRH    Drug/Infectious Status (If Applicable):  No results found for: HIV, HEPCAB    COVID-19 Screening (If Applicable):   Lab Results   Component Value Date    COVID19 Not Detected 10/02/2020         Anesthesia Evaluation    Airway: Mallampati: I  TM distance: >3 FB   Neck ROM: full  Mouth opening: > = 3 FB Dental:          Pulmonary:       (-) shortness of breath Cardiovascular:  Exercise tolerance: good (>4 METS),   (+) hypertension:,     (-)  angina                Neuro/Psych:               GI/Hepatic/Renal:             Endo/Other:                     Abdominal:           Vascular:                                      Anesthesia Plan      general     ASA 2             Anesthetic plan and risks discussed with patient.                       Cortez Lundborg, MD   10/6/2020

## 2020-10-06 NOTE — OP NOTE
Operative Note      Patient: Vivian Rivera  YOB: 1945  MRN: [id number]    Date of Procedure: 10/6/2020    Pre-Op Diagnosis: Left breast cancer, Pre-procedure stage T1b, N0, M0    Post-Op Diagnosis: Same       Procedure: Left breast needle localized lumpectomy, left axillary sentinel lymph node sampling    Surgeon(s):  Leopoldo Morrow MD    Assistant:   Resident: Wagner Landeros DO; Thania Raymundo DO    Anesthesia: General    Estimated Blood Loss (mL): Minimal    Complications: None    Specimens:   ID Type Source Tests Collected by Time Destination   A : LEFT BREAST LUMPECTOMY SHORT STITCH SUPERIOR, LONG STITCH LATERAL. Tissue Breast SURGICAL PATHOLOGY Leopoldo Morrow MD 10/6/2020 1041    B : SENTINEL LYMPH NDE OF LEFT BREAST Tissue Breast SURGICAL PATHOLOGY Leopoldo Morrow MD 10/6/2020 1053        Implants:  * No implants in log *      Drains: * No LDAs found *    Findings: 2 separate areas in the upper outer quadrant of the left breast included in one specimen, short stitch superior, and long stitch lateral.  Both areas localized with needles preoperatively. Left axillary sentinel lymph node excised    Indications for procedure: This is a very pleasant 24-year-old female with recently diagnosed left breast cancer. The patient had a separate adjacent area of atypical ductal hyperplasia. The risks and benefits of left breast lumpectomy with left axillary sentinel lymph node sampling were discussed with the patient and verbal and written consent was obtained. Detailed Description of Procedure:   After verbal and written consent, the patient was brought to the operating room. After appropriate monitoring, general anesthesia was administered. A timeout was performed with the staff in the room confirming both the patient and the procedure to be performed. The patient's left breast and chest wall were prepped and draped in the standard sterile fashion.   Local anesthesia was infiltrated into the skin and subcutaneous tissue was a field block. An elliptical incision excising both localizing wires was then made with a scalpel. Dissection was carried down through the subcutaneous tissue with cautery. A generous circumferential margin around the wires was then dissected with cautery. Once excised, including the anterior portion of the pectoralis fascia, the specimen was oriented with a short stitch superior and long stitch lateral.  This was sent for x-ray confirming the clips and wire within the specimen submitted. Once hemostasis was assured, and through the same incision, the left axillary fascia was opened. A left axillary sentinel lymph node was identified with the neoprobe. This was excised and sent for intraoperative frozen section. Clips were then placed within the lumpectomy cavity. Once hemostasis was assured, the incision was closed in layers with interrupted Vicryl suture used to close the deep dermal tissue and a running Monocryl suture used to close the skin. Dermabond was then applied to the wound. The sponge, lap, needle, and instrument count were correct at the end of the case. The patient was awakened from anesthesia and transported to the recovery in stable condition. There were no immediate complications.     Electronically signed by Camden Sanders MD on 10/6/2020 at 11:00 AM

## 2020-10-06 NOTE — H&P
History and Physical Update    Pt Name: Heather Whitney  MRN: [id number]  YOB: 1945  Date of evaluation: 10/6/2020      [x] I have reviewed the hardcopy surgery Progress Note by Dr Darrius Harman dated 9/10/20 in short chartwhich meets the criteria for an Interval History and Physical note. [x] I have examined  Heather Whitney  There are no changes to the patient who is scheduled for a left breast needle localization @ 0800 lumpectomy with left sentinel node biopsy @0900, possible axillary lymph node dissection by Dr Darrius Harman for left breast cancer. The patient denies fever, chills, wheezing, productive cough, increased SOB, chest pain, open sores or wounds. Last ASA 81mg 9/29/20    PMH, Surgical History, Social History, Psych, and Family History reviewed and updated in EPIC in appropriate section. Vital signs: BP (!) 152/87   Pulse 80   Temp 96.8 °F (36 °C)   Resp 18   SpO2 97%     Allergies:  Patient has no known allergies. Medications:    Prior to Admission medications    Medication Sig Start Date End Date Taking?  Authorizing Provider   propranolol (INDERAL LA) 160 MG extended release capsule Take 160 mg by mouth daily    Historical Provider, MD   sertraline (ZOLOFT) 100 MG tablet Take 100 mg by mouth daily    Historical Provider, MD   aspirin 81 MG chewable tablet Take 81 mg by mouth every other day     Historical Provider, MD   atorvastatin (LIPITOR) 40 MG tablet Take 40 mg by mouth daily  10/1/17   Historical Provider, MD   lisinopril-hydrochlorothiazide (PRINZIDE;ZESTORETIC) 20-12.5 MG per tablet Take 1 tablet by mouth daily  8/23/17   Historical Provider, MD   omeprazole (PRILOSEC) 20 MG delayed release capsule Take 20 mg by mouth Daily  10/20/18   Historical Provider, MD   SUMAtriptan (IMITREX) 100 MG tablet Take 100 mg by mouth once as needed  8/31/18   Historical Provider, MD         This is a 66 y.o. female who is pleasant, cooperative, alert and oriented x3, in no acute distress. Heart: Heart sounds are normal.  HR 80 regular rate and rhythm without murmur, gallop or rub. Lungs: Normal respiratory effort with equal expansion, good air exchange, unlabored and clear to auscultation without wheezes or rales bilaterally   Abdomen: soft, nontender, nondistended with bowel sounds.        Labs:  Recent Labs     09/25/20  1253   HGB 12.9   HCT 42.0   WBC 9.5   MCV 96.1         K 3.9      CO2 24   BUN 16   CREATININE 0.96*       Recent Labs     10/02/20  1300   COVID19 Not Detected       MyAnand boneda Timmy  APRN, ANP-BC  Electronically signed 10/6/2020 at 7:22 AM

## 2020-10-09 LAB — SURGICAL PATHOLOGY REPORT: NORMAL

## 2020-10-12 ENCOUNTER — TELEPHONE (OUTPATIENT)
Dept: ONCOLOGY | Age: 75
End: 2020-10-12

## 2020-10-12 NOTE — TELEPHONE ENCOUNTER
Called and spoke with Carmen Lobo today. She relates she is doing very well after surgery. She relates she has a follow up with surgeon later this week. I answered questions. Reminded her since she is a left breast cancer Deep inspiratory breath hold would be an option to protect the heart and lung from radiation. Carmen Diamond is aware that we can do the DIBH technique at [facility]. She does not have referrals to med onc or rad onc at this time. I let her know she can call at any time. I also suggested that we talk next week after her surgical appointment to see if I can assist with anything. Pt sounds in good spirits. Pt on pending.

## 2020-10-15 ENCOUNTER — HOSPITAL ENCOUNTER (OUTPATIENT)
Dept: PREADMISSION TESTING | Age: 75
Discharge: HOME | End: 2020-10-19
Payer: MEDICARE

## 2020-10-15 PROCEDURE — U0003 INFECTIOUS AGENT DETECTION BY NUCLEIC ACID (DNA OR RNA); SEVERE ACUTE RESPIRATORY SYNDROME CORONAVIRUS 2 (SARS-COV-2) (CORONAVIRUS DISEASE [COVID-19]), AMPLIFIED PROBE TECHNIQUE, MAKING USE OF HIGH THROUGHPUT TECHNOLOGIES AS DESCRIBED BY CMS-2020-01-R: HCPCS

## 2020-10-17 LAB — SARS-COV-2, NAA: NOT DETECTED

## 2020-10-18 ENCOUNTER — TELEPHONE (OUTPATIENT)
Dept: PRIMARY CARE CLINIC | Age: 75
End: 2020-10-18

## 2020-10-19 ENCOUNTER — ANESTHESIA EVENT (OUTPATIENT)
Dept: OPERATING ROOM | Age: 75
End: 2020-10-19
Payer: MEDICARE

## 2020-10-19 ENCOUNTER — TELEPHONE (OUTPATIENT)
Dept: ONCOLOGY | Age: 75
End: 2020-10-19

## 2020-10-19 NOTE — TELEPHONE ENCOUNTER
Called and spoke with Moustapha Lovemurali. She relates she is doing very well. She also explains that she is having another lumpectomy tomorrow. She relates she was told that they did not get it all. She is understanding and ready to proceed. We talked about that this sometimes happens and it is best to make sure we have removed all the cancer cells. I let her know I would reach out to her after the surgery. Asked her if she had any questions or needs I could assist with. Unknown Kehr denies any questions or needs. Pt on pending.

## 2020-10-20 ENCOUNTER — ANESTHESIA (OUTPATIENT)
Dept: OPERATING ROOM | Age: 75
End: 2020-10-20
Payer: MEDICARE

## 2020-10-20 ENCOUNTER — HOSPITAL ENCOUNTER (OUTPATIENT)
Age: 75
Setting detail: OUTPATIENT SURGERY
Discharge: HOME | End: 2020-10-20
Attending: SURGERY | Admitting: SURGERY
Payer: MEDICARE

## 2020-10-20 VITALS
WEIGHT: 174 LBS | BODY MASS INDEX: 27.97 KG/M2 | HEIGHT: 66 IN | DIASTOLIC BLOOD PRESSURE: 82 MMHG | RESPIRATION RATE: 22 BRPM | SYSTOLIC BLOOD PRESSURE: 154 MMHG | OXYGEN SATURATION: 98 % | HEART RATE: 78 BPM | TEMPERATURE: 97.3 F

## 2020-10-20 VITALS
DIASTOLIC BLOOD PRESSURE: 71 MMHG | OXYGEN SATURATION: 100 % | RESPIRATION RATE: 15 BRPM | TEMPERATURE: 97.2 F | SYSTOLIC BLOOD PRESSURE: 141 MMHG

## 2020-10-20 PROCEDURE — 7100000010 HC PHASE II RECOVERY - FIRST 15 MIN: Performed by: SURGERY

## 2020-10-20 PROCEDURE — 2580000003 HC RX 258: Performed by: ANESTHESIOLOGY

## 2020-10-20 PROCEDURE — 3700000001 HC ADD 15 MINUTES (ANESTHESIA): Performed by: SURGERY

## 2020-10-20 PROCEDURE — 3600000012 HC SURGERY LEVEL 2 ADDTL 15MIN: Performed by: SURGERY

## 2020-10-20 PROCEDURE — 6360000002 HC RX W HCPCS: Performed by: SPECIALIST

## 2020-10-20 PROCEDURE — 88305 TISSUE EXAM BY PATHOLOGIST: CPT

## 2020-10-20 PROCEDURE — 2500000003 HC RX 250 WO HCPCS: Performed by: SPECIALIST

## 2020-10-20 PROCEDURE — 3700000000 HC ANESTHESIA ATTENDED CARE: Performed by: SURGERY

## 2020-10-20 PROCEDURE — 6360000002 HC RX W HCPCS: Performed by: SURGERY

## 2020-10-20 PROCEDURE — 7100000000 HC PACU RECOVERY - FIRST 15 MIN: Performed by: SURGERY

## 2020-10-20 PROCEDURE — 2500000003 HC RX 250 WO HCPCS: Performed by: SURGERY

## 2020-10-20 PROCEDURE — 3600000002 HC SURGERY LEVEL 2 BASE: Performed by: SURGERY

## 2020-10-20 PROCEDURE — 7100000011 HC PHASE II RECOVERY - ADDTL 15 MIN: Performed by: SURGERY

## 2020-10-20 PROCEDURE — 2709999900 HC NON-CHARGEABLE SUPPLY: Performed by: SURGERY

## 2020-10-20 PROCEDURE — 7100000001 HC PACU RECOVERY - ADDTL 15 MIN: Performed by: SURGERY

## 2020-10-20 RX ORDER — EPHEDRINE SULFATE/0.9% NACL/PF 50 MG/5 ML
SYRINGE (ML) INTRAVENOUS PRN
Status: DISCONTINUED | OUTPATIENT
Start: 2020-10-20 | End: 2020-10-20 | Stop reason: SDUPTHER

## 2020-10-20 RX ORDER — PROPOFOL 10 MG/ML
INJECTION, EMULSION INTRAVENOUS PRN
Status: DISCONTINUED | OUTPATIENT
Start: 2020-10-20 | End: 2020-10-20 | Stop reason: SDUPTHER

## 2020-10-20 RX ORDER — ONDANSETRON 4 MG/1
4 TABLET, FILM COATED ORAL EVERY 8 HOURS PRN
Qty: 20 TABLET | Refills: 0 | Status: ON HOLD | OUTPATIENT
Start: 2020-10-20 | End: 2020-11-03

## 2020-10-20 RX ORDER — SODIUM CHLORIDE, SODIUM LACTATE, POTASSIUM CHLORIDE, CALCIUM CHLORIDE 600; 310; 30; 20 MG/100ML; MG/100ML; MG/100ML; MG/100ML
INJECTION, SOLUTION INTRAVENOUS CONTINUOUS
Status: DISCONTINUED | OUTPATIENT
Start: 2020-10-21 | End: 2020-10-20 | Stop reason: HOSPADM

## 2020-10-20 RX ORDER — LIDOCAINE HYDROCHLORIDE 20 MG/ML
INJECTION, SOLUTION EPIDURAL; INFILTRATION; INTRACAUDAL; PERINEURAL PRN
Status: DISCONTINUED | OUTPATIENT
Start: 2020-10-20 | End: 2020-10-20 | Stop reason: SDUPTHER

## 2020-10-20 RX ORDER — HYDROMORPHONE HCL 110MG/55ML
0.25 PATIENT CONTROLLED ANALGESIA SYRINGE INTRAVENOUS EVERY 5 MIN PRN
Status: DISCONTINUED | OUTPATIENT
Start: 2020-10-20 | End: 2020-10-20 | Stop reason: HOSPADM

## 2020-10-20 RX ORDER — SODIUM CHLORIDE 0.9 % (FLUSH) 0.9 %
10 SYRINGE (ML) INJECTION EVERY 12 HOURS SCHEDULED
Status: DISCONTINUED | OUTPATIENT
Start: 2020-10-20 | End: 2020-10-20 | Stop reason: HOSPADM

## 2020-10-20 RX ORDER — ONDANSETRON 2 MG/ML
4 INJECTION INTRAMUSCULAR; INTRAVENOUS
Status: DISCONTINUED | OUTPATIENT
Start: 2020-10-20 | End: 2020-10-20 | Stop reason: HOSPADM

## 2020-10-20 RX ORDER — HYDROCODONE BITARTRATE AND ACETAMINOPHEN 5; 325 MG/1; MG/1
1 TABLET ORAL EVERY 4 HOURS PRN
Qty: 30 TABLET | Refills: 0 | Status: SHIPPED | OUTPATIENT
Start: 2020-10-20 | End: 2020-10-27

## 2020-10-20 RX ORDER — FENTANYL CITRATE 50 UG/ML
25 INJECTION, SOLUTION INTRAMUSCULAR; INTRAVENOUS EVERY 5 MIN PRN
Status: DISCONTINUED | OUTPATIENT
Start: 2020-10-20 | End: 2020-10-20 | Stop reason: HOSPADM

## 2020-10-20 RX ORDER — ONDANSETRON 2 MG/ML
INJECTION INTRAMUSCULAR; INTRAVENOUS PRN
Status: DISCONTINUED | OUTPATIENT
Start: 2020-10-20 | End: 2020-10-20 | Stop reason: SDUPTHER

## 2020-10-20 RX ORDER — FENTANYL CITRATE 50 UG/ML
INJECTION, SOLUTION INTRAMUSCULAR; INTRAVENOUS PRN
Status: DISCONTINUED | OUTPATIENT
Start: 2020-10-20 | End: 2020-10-20 | Stop reason: SDUPTHER

## 2020-10-20 RX ORDER — LIDOCAINE HYDROCHLORIDE 10 MG/ML
1 INJECTION, SOLUTION EPIDURAL; INFILTRATION; INTRACAUDAL; PERINEURAL
Status: DISCONTINUED | OUTPATIENT
Start: 2020-10-21 | End: 2020-10-20 | Stop reason: HOSPADM

## 2020-10-20 RX ORDER — SODIUM CHLORIDE 0.9 % (FLUSH) 0.9 %
10 SYRINGE (ML) INJECTION PRN
Status: DISCONTINUED | OUTPATIENT
Start: 2020-10-20 | End: 2020-10-20 | Stop reason: HOSPADM

## 2020-10-20 RX ORDER — SODIUM CHLORIDE 9 MG/ML
INJECTION, SOLUTION INTRAVENOUS CONTINUOUS
Status: DISCONTINUED | OUTPATIENT
Start: 2020-10-21 | End: 2020-10-20

## 2020-10-20 RX ADMIN — CEFAZOLIN 2 G: 10 INJECTION, POWDER, FOR SOLUTION INTRAVENOUS at 15:19

## 2020-10-20 RX ADMIN — ONDANSETRON 4 MG: 2 INJECTION, SOLUTION INTRAMUSCULAR; INTRAVENOUS at 15:42

## 2020-10-20 RX ADMIN — LIDOCAINE HYDROCHLORIDE 100 MG: 20 INJECTION, SOLUTION EPIDURAL; INFILTRATION; INTRACAUDAL; PERINEURAL at 15:18

## 2020-10-20 RX ADMIN — Medication 50 MCG: at 15:19

## 2020-10-20 RX ADMIN — SODIUM CHLORIDE, POTASSIUM CHLORIDE, SODIUM LACTATE AND CALCIUM CHLORIDE: 600; 310; 30; 20 INJECTION, SOLUTION INTRAVENOUS at 14:51

## 2020-10-20 RX ADMIN — SODIUM CHLORIDE, POTASSIUM CHLORIDE, SODIUM LACTATE AND CALCIUM CHLORIDE: 600; 310; 30; 20 INJECTION, SOLUTION INTRAVENOUS at 15:13

## 2020-10-20 RX ADMIN — Medication 20 MG: at 15:35

## 2020-10-20 RX ADMIN — PROPOFOL 130 MG: 10 INJECTION, EMULSION INTRAVENOUS at 15:18

## 2020-10-20 ASSESSMENT — PULMONARY FUNCTION TESTS
PIF_VALUE: 0
PIF_VALUE: 21
PIF_VALUE: 8
PIF_VALUE: 17
PIF_VALUE: 2
PIF_VALUE: 1
PIF_VALUE: 2
PIF_VALUE: 5
PIF_VALUE: 14
PIF_VALUE: 16
PIF_VALUE: 20
PIF_VALUE: 19
PIF_VALUE: 2
PIF_VALUE: 17
PIF_VALUE: 23
PIF_VALUE: 1
PIF_VALUE: 3
PIF_VALUE: 2
PIF_VALUE: 21
PIF_VALUE: 17
PIF_VALUE: 1
PIF_VALUE: 6
PIF_VALUE: 2
PIF_VALUE: 2
PIF_VALUE: 21
PIF_VALUE: 2
PIF_VALUE: 2
PIF_VALUE: 22
PIF_VALUE: 17
PIF_VALUE: 2
PIF_VALUE: 16
PIF_VALUE: 21
PIF_VALUE: 20
PIF_VALUE: 17
PIF_VALUE: 21
PIF_VALUE: 16
PIF_VALUE: 3
PIF_VALUE: 2
PIF_VALUE: 36
PIF_VALUE: 2
PIF_VALUE: 3
PIF_VALUE: 23
PIF_VALUE: 14
PIF_VALUE: 16
PIF_VALUE: 17
PIF_VALUE: 23
PIF_VALUE: 22
PIF_VALUE: 14
PIF_VALUE: 17
PIF_VALUE: 2
PIF_VALUE: 24
PIF_VALUE: 20
PIF_VALUE: 17
PIF_VALUE: 22
PIF_VALUE: 21
PIF_VALUE: 1
PIF_VALUE: 17
PIF_VALUE: 21
PIF_VALUE: 13
PIF_VALUE: 3
PIF_VALUE: 21
PIF_VALUE: 2
PIF_VALUE: 2
PIF_VALUE: 19

## 2020-10-20 ASSESSMENT — PAIN SCALES - GENERAL
PAINLEVEL_OUTOF10: 0

## 2020-10-20 ASSESSMENT — ENCOUNTER SYMPTOMS: SHORTNESS OF BREATH: 0

## 2020-10-20 ASSESSMENT — PAIN - FUNCTIONAL ASSESSMENT: PAIN_FUNCTIONAL_ASSESSMENT: 0-10

## 2020-10-20 NOTE — ANESTHESIA PRE PROCEDURE
Department of Anesthesiology  Preprocedure Note       Name:  Audrie Dubin   Age:  66 y.o.  :  1945                                          MRN:  [id number]         Date:  10/20/2020      Surgeon: Arie Chambers):  Regina Merino MD    Procedure: Procedure(s):  REEXCISION LEFT BREAST LUMPECTOMY    Medications prior to admission:   Prior to Admission medications    Medication Sig Start Date End Date Taking? Authorizing Provider   sennosides-docusate sodium (SENOKOT-S) 8.6-50 MG tablet Take 1 tablet by mouth daily 10/6/20   Ivana Kyle DO   ondansetron (ZOFRAN) 4 MG tablet Take 1 tablet by mouth every 8 hours as needed for Nausea or Vomiting 10/6/20   Ivana Kyle DO   propranolol (INDERAL LA) 160 MG extended release capsule Take 160 mg by mouth daily    Historical Provider, MD   sertraline (ZOLOFT) 100 MG tablet Take 100 mg by mouth daily    Historical Provider, MD   aspirin 81 MG chewable tablet Take 81 mg by mouth every other day     Historical Provider, MD   atorvastatin (LIPITOR) 40 MG tablet Take 40 mg by mouth daily  10/1/17   Historical Provider, MD   lisinopril-hydrochlorothiazide (PRINZIDE;ZESTORETIC) 20-12.5 MG per tablet Take 1 tablet by mouth daily  17   Historical Provider, MD   omeprazole (PRILOSEC) 20 MG delayed release capsule Take 20 mg by mouth Daily  10/20/18   Historical Provider, MD   SUMAtriptan (IMITREX) 100 MG tablet Take 100 mg by mouth once as needed  18   Historical Provider, MD       Current medications:    No current facility-administered medications for this visit. No current outpatient medications on file.      Facility-Administered Medications Ordered in Other Visits   Medication Dose Route Frequency Provider Last Rate Last Dose    ceFAZolin (ANCEF) 2 g in dextrose 5 % 50 mL IVPB  2 g Intravenous Once Regina Merino MD        [START ON 10/21/2020] 0.9 % sodium chloride infusion   Intravenous Continuous Amita Morris DO        [START ON 10/21/2020] lactated ringers infusion   Intravenous Continuous Celesta Min, DO        sodium chloride flush 0.9 % injection 10 mL  10 mL Intravenous 2 times per day Jason Rios DO        sodium chloride flush 0.9 % injection 10 mL  10 mL Intravenous PRN Jason Rios DO        [START ON 10/21/2020] lidocaine PF 1 % injection 1 mL  1 mL Intradermal Once PRN Celesta Min, DO           Allergies:  No Known Allergies    Problem List:  There is no problem list on file for this patient. Past Medical History:        Diagnosis Date    Arthritis     Cancer [facility])     breast    Cardiac murmur     Depression     GERD (gastroesophageal reflux disease)     Heart palpitations     with anxiety    Hyperlipidemia     Hypertension     LBBB (left bundle branch block)     Per the pt's medical record.  Migraine     PVC (premature ventricular contraction)     per the pt's medical record       Past Surgical History:        Procedure Laterality Date    BREAST LUMPECTOMY Left 10/6/2020    LEFT UPPER OUTER QUADRANT LUMPECTOMY. WITH SENTINEL LYMPH NODE BIOPSY,  AXILLARY LYMPH NODE DISSECTION performed by El Forte MD at [address]      x 2     COLONOSCOPY      JOINT REPLACEMENT Bilateral     knee    US BREAST BIOPSY NEEDLE ADDITIONAL LEFT  8/6/2020    US BREAST BIOPSY NEEDLE ADDITIONAL LEFT 8/6/2020 [facility]    US BREAST NEEDLE BIOPSY LEFT  8/6/2020    US BREAST NEEDLE BIOPSY LEFT 8/6/2020 [facility]    US GUIDED NEEDLE LOC OF LEFT BREAST  10/6/2020    US GUIDED NEEDLE LOC OF LEFT BREAST 10/6/2020 [facility]       Social History:    Social History     Tobacco Use    Smoking status: Former Smoker    Smokeless tobacco: Never Used   Substance Use Topics    Alcohol use: Yes     Alcohol/week: 7.0 standard drinks     Types: 7 Glasses of wine per week                                Counseling given: Not Answered      Vital Signs (Current):    There were no vitals filed for this visit. BP Readings from Last 3 Encounters:   10/20/20 (!) 147/98   10/06/20 (!) 145/69   10/06/20 (!) 175/97       NPO Status:                                                                                 BMI:   Wt Readings from Last 3 Encounters:   10/20/20 174 lb (78.9 kg)   10/06/20 174 lb 2.6 oz (79 kg)   09/25/20 174 lb 2.6 oz (79 kg)     There is no height or weight on file to calculate BMI.    CBC:   Lab Results   Component Value Date    WBC 9.5 09/25/2020    RBC 4.37 09/25/2020    HGB 12.9 09/25/2020    HCT 42.0 09/25/2020    MCV 96.1 09/25/2020    RDW 14.4 09/25/2020     09/25/2020       CMP:   Lab Results   Component Value Date     09/25/2020    K 3.9 09/25/2020     09/25/2020    CO2 24 09/25/2020    BUN 16 09/25/2020    CREATININE 0.96 09/25/2020    GFRAA >60 09/25/2020    LABGLOM 57 09/25/2020    GLUCOSE 107 10/19/2015    GLUCOSE 103 06/01/2012    CALCIUM 9.1 10/19/2015    AST 18 04/15/2013    ALT 21 04/15/2013       POC Tests: No results for input(s): POCGLU, POCNA, POCK, POCCL, POCBUN, POCHEMO, POCHCT in the last 72 hours.     Coags: No results found for: PROTIME, INR, APTT    HCG (If Applicable): No results found for: PREGTESTUR, PREGSERUM, HCG, HCGQUANT     ABGs: No results found for: PHART, PO2ART, EMJ5URA, ZTQ8TVK, BEART, J6RTSDFA     Type & Screen (If Applicable):  No results found for: LABABO, LABRH    Drug/Infectious Status (If Applicable):  No results found for: HIV, HEPCAB    COVID-19 Screening (If Applicable):   Lab Results   Component Value Date    COVID19 Not Detected 10/15/2020         Anesthesia Evaluation   no history of anesthetic complications:   Airway: Mallampati: I  TM distance: >3 FB   Neck ROM: full  Mouth opening: > = 3 FB Dental:          Pulmonary:normal exam        (-) shortness of breath                           Cardiovascular:  Exercise tolerance: good (>4 METS),   (+) hypertension:,

## 2020-10-20 NOTE — H&P
History and Physical Service   [facility] - INPATIENT    HISTORY AND PHYSICAL EXAMINATION            Date of Evaluation: 10/20/2020  Patient name:  Vivian Rivera  MRN:   [id number]  YOB: 1945  PCP:    Richie Reilly MD    History Obtained From:     Patient, medical records    History of Present Illness: This is Vivian Rivera a 66 y.o. female who presernts today for a re-excision left breast lumpectomy by Dr Julissa Schmid for invasive ductal carcinoma. .Patient is s/p left breast needle localization, lumpectomy with left sentinel node biopsy and lymph node biopsy 10/6/20 by Dr Julissa Schmid for left breast cancer. Patient did well postoperatively with her procedure. Follow up with Dr Paula Adam on 10/15/20 Discussed pathology results of biopsy with close margins and need for additional surgery. ER: Positive (95%, strong intensity). MA: Positive (40%, strong intensity) and    HER-2 FISH: Negative . Patient returns for her procedure. She denies a fever, chills, cough, increased SOB, wheezing, or chest pain  Denies open sores or rash. Last ASA 81mg 10/13/20     Past Medical History:     Past Medical History:   Diagnosis Date    Arthritis     Cancer ([facility] [address]     breast    Cardiac murmur     Depression     GERD (gastroesophageal reflux disease)     Heart palpitations     with anxiety    Hyperlipidemia     Hypertension     LBBB (left bundle branch block)     Per the pt's medical record.  Migraine     PVC (premature ventricular contraction)     per the pt's medical record        Past Surgical History:     Past Surgical History:   Procedure Laterality Date    BREAST LUMPECTOMY Left 10/6/2020    LEFT UPPER OUTER QUADRANT LUMPECTOMY.  WITH SENTINEL LYMPH NODE BIOPSY,  AXILLARY LYMPH NODE DISSECTION performed by Julissa Schmid MD at [address]      x 2     COLONOSCOPY      JOINT REPLACEMENT Bilateral     knee    US BREAST BIOPSY NEEDLE ADDITIONAL LEFT  8/6/2020 US BREAST BIOPSY NEEDLE ADDITIONAL LEFT 8/6/2020 [facility]    US BREAST NEEDLE BIOPSY LEFT  8/6/2020    US BREAST NEEDLE BIOPSY LEFT 8/6/2020 [facility]    US GUIDED NEEDLE LOC OF LEFT BREAST  10/6/2020    US GUIDED NEEDLE LOC OF LEFT BREAST 10/6/2020 [facility]        Medications Prior to Admission:     Prior to Admission medications    Medication Sig Start Date End Date Taking? Authorizing Provider   sennosides-docusate sodium (SENOKOT-S) 8.6-50 MG tablet Take 1 tablet by mouth daily 10/6/20   Ivana Kyle,    ondansetron (ZOFRAN) 4 MG tablet Take 1 tablet by mouth every 8 hours as needed for Nausea or Vomiting 10/6/20   Ivana Kyle, DO   propranolol (INDERAL LA) 160 MG extended release capsule Take 160 mg by mouth daily    Historical Provider, MD   sertraline (ZOLOFT) 100 MG tablet Take 100 mg by mouth daily    Historical Provider, MD   aspirin 81 MG chewable tablet Take 81 mg by mouth every other day     Historical Provider, MD   atorvastatin (LIPITOR) 40 MG tablet Take 40 mg by mouth daily  10/1/17   Historical Provider, MD   lisinopril-hydrochlorothiazide (PRINZIDE;ZESTORETIC) 20-12.5 MG per tablet Take 1 tablet by mouth daily  8/23/17   Historical Provider, MD   omeprazole (PRILOSEC) 20 MG delayed release capsule Take 20 mg by mouth Daily  10/20/18   Historical Provider, MD   SUMAtriptan (IMITREX) 100 MG tablet Take 100 mg by mouth once as needed  8/31/18   Historical Provider, MD        Allergies:     Patient has no known allergies. Social History:     Tobacco:    reports that she has quit smoking. She has never used smokeless tobacco.  Alcohol:      reports current alcohol use of about 7.0 standard drinks of alcohol per week. Drug Use:  reports no history of drug use. Family History:     Family History   Problem Relation Age of Onset    Heart Attack Mother     Liver Cancer Sister        Review of Systems:     Positive and Negative as described in HPI.     CONSTITUTIONAL: rub.  Abdomen: Soft, nontender, nondistended, normal bowel sounds, no hepatomegaly or splenomegaly  Neurologic: There are no new focal motor or sensory deficits, normal muscle tone and bulk, no abnormal sensation, normal speech, cranial nerves II through XII grossly intact  Skin: No gross lesions, rashes, bruising or bleeding on exposed skin area  Extremities:  peripheral pulses palpable, no pedal edema or calf pain with palpation  Psych: normal affect     Investigations:      Laboratory Testing:  No results found for this or any previous visit (from the past 24 hour(s)). Recent Labs     09/25/20  1253   HGB 12.9   HCT 42.0   WBC 9.5   MCV 96.1      K 3.9      CO2 24   BUN 16   CREATININE 0.96*       Recent Labs     10/15/20  [address] Not Detected     Imaging/Diagnostics:  10/9/2020  5:32 PM - Emre, Cathleen Incoming Lab Results From [facility]     Component  Collected  Lab    Surgical Pathology Report  10/06/2020  2:04 PM  [address]    -- Diagnosis --   1. LEFT BREAST, LUMPECTOMY:   -INVASIVE DUCTAL CARCINOMA, MODERATELY DIFFERENTIATED, SIZE 28.0 MM,   EXTENDING FOCALLY TO SUPERIOR MARGIN (LESS THAN 1.0 MM LINEAR EXTENT)   AND EXTENDING TO THE ANTERIOR MARGIN AT MULTIPLE FOCI WITH THE LARGEST   LINEAR EXTENT OF 5.0 MM.     -FOCI OF ATYPICAL DUCTAL HYPERPLASIA. -BIOPSY SITE CHANGES. -BACKGROUND BREAST PARENCHYMA WITH FIBROCYSTIC CHANGES WITH   CALCIFICATIONS, DUCT ECTASIA, USUAL DUCTAL HYPERPLASIA, AND RADIAL   SCLEROSING LESION. -BENIGN SKIN. 2.  SENTINEL LYMPH NODE OF LEFT BREAST, EXCISION:   -ONE (1) LYMPH NODE NEGATIVE FOR CARCINOMA. 3.  NEW INFERIOR MARGIN LEFT BREAST, EXCISION:   -FIBROCYSTIC CHANGE WITH CALCIFICATION, USUAL DUCTAL HYPERPLASIA,   ADENOSIS, AND DUCT ECTASIA. -NEGATIVE FOR CARCINOMA.        -- Diagnosis Comment --   ER, SD, AND HER-2 FISH WERE PERFORMED ON THE PATIENT'S PREVIOUS BREAST   BIOPSY ([id number]) WITH THE FOLLOWING RESULTS:   ER: POSITIVE (95%, STRONG INTENSITY). NE: POSITIVE (40%, STRONG INTENSITY). HER-2 FISH: NEGATIVE. Jasmin Kline M.D   **Electronically Signed Out**         charlene/10/7/2020        Clinical Information   Pre-op Diagnosis:  LEFT BR CA   Operative Findings:  LEFT BREAST LUMPECTOMY  SHORT STITCH SUPERIOR,   LONG STITCH LATERAL; SENTINEL LYMPH NODE OF LEFT BREAST; WHITE STITCH   LOPEZ NEW INFERIOR MARGIN LEFT BREAST   Operation Performed:  LEFT BREAST NEEDLE LOC, LUMPECTOMY WITH LEFT   SENTINEL NODE BIOPSY, POSSIBLE AXILLARY LYMPH NODE DISSECTION     Source of Specimen   1: LEFT BREAST LUMPECTOMY   2: SENTINEL LYMPH NODE OF LEFT BREAST   3: WHITE STITCH MARKS NEW INFERIOR MARGIN LEFT BREAST     Gross Description   1. \"ZAY BRUNOKLEY, LEFT BREAST LUMPECTOMY\" Received fresh, is a 9.8   x 8.5 x 3.8 cm (M-L x S-I x A-P) oriented portion of focally disrupted   fibrofatty tissue with two localization wires. Surmounted, is a 4.0 x   1.5 cm unremarkable tan skin ellipse. The specimen is oriented with   the superior margin inked blue, anterior green, deep black and   inferior red. Sectioning reveals a 2.8 x 2.6 x 2.5 cm dense tan-white   mass lesion that is 0.1 cm from the superior anterior margin and 0.5   cm from the deep margin and at least 1.0 cm from the remaining   margins. The mass is centrally cavitary consistent with previous   biopsy. Dense focally gritty fibrous tissue focally extends from the   mass to involve the anterior, superior, deep, inferior and medial   margins. The skin is unremarkable. The uninvolved cut surfaces are   focally microcystic. No other discrete lesions are seen. Cassette   summary:  \"A-L\" representative sections sequentially submitted from   medial to lateral with the medial and lateral margins submitted as   shaves and the remaining margins perpendicular. Skin is in \"I\" and   the lesion is in \"B-G. \"  All six margins are represented. 2.   \"ZAY PRUETT, SENTINEL LYMPH NODE OF LEFT BREAST\" 3.5 carcinoma extends to the   superior margin focally (linear extent < 1.0 mm). The anterior margin   is partially fragmented and heavily cauterized and the carcinoma   extends to multiple foci with the largest linear extent of 5.0 mm. (The carcinoma is 8.0 mm from the posterior/deep margin and 17.0 mm   from the inferior margin in the lumpectomy specimen). - IF ALL NEGATIVE--   --SITE(S) AND DISTANCE TO CLOSEST: N/A.   MARGINS - DCIS -   - SITE(S) OF ALL POSITIVE MARGINS: N/A.   - IF ALL NEGATIVE--   --SITE(S) AND DISTANCE TO CLOSEST:      DCIS is 8.0 mm from the   anterior and posterior margins. LYMPHOVASCULAR INVASION: Not identified. REGIONAL LYMPH NODES -       - NUMBER EXAMINED (SENTINEL AND NON-SENTINEL): 1.       - NUMBER OF SENTINEL: 1.         - NUMBER WITH MACROMETASTASES: N/A.       - NUMBER WITH MICROMETASTASES:      N/A.   - NUMBER WITH ISOLATED TUMOR CELLS: N/A.       - SIZE OF LARGEST METASTATIC DEPOSIT: N/A.       - EXTRANODAL EXTENSION: N/A.       TREATMENT EFFECT: RESPONSE TO PRE-SURGICAL   (NEOADJUVANT) THERAPY (IF APPLICABLE)--       - IN BREAST TISSUES: No known presurgical therapy. - IN LYMPH NODES: No known presurgical therapy. DISTANT METASTASIS pM   (only if confirmed pathologically in this case): N/A. PATHOLOGIC STAGE CLASSIFICATION (pTNM):     pT2  pN0   (T and /or N descriptors used only if applicable)     CAP InvasiveBreast 4400 in conjunction with AJCC 8 ed. BIOMARKER TESTING  BREAST CARCINOMA         ESTROGEN RECEPTOR*--   -POSITIVE (PERCENT =>10% OF POSITIVE TUMOR CELL NUCLEI): See comment   above. ---AVERAGE INTENSITY OF POSITIVE STAINING: See comment above. -LOW POSITIVE (PERCENT 1-10% OF POSITIVE TUMOR CELL NUCLEI): See   comment above. ---AVERAGE INTENSITY OF POSITIVE STAINING: See comment above. -NEGATIVE (PERCENT <1% OF POSITIVE TUMOR CELL NUCLEI): See comment   above.        ---AVERAGE INTENSITY OF ANY POSITIVE STAINING: ZAY STOUT [facility] Rec: [id number]   Path Number: [id number]     [address], [facility] [address]. [place], [address]   [phone]   Fax: [phone]    Testing Performed By     Olesya Chiang  Name  Director  Address  Valid Date Range    [address], [address] [address]  08/30/17 0801-Present    Lab and Collection     Surgical Pathology - 10/6/2020   Result Information     Status: Final result (Collected: 10/6/2020 14:04)  Provider Status: Reviewed    All Reviewers List     Isael Camp MD on 10/12/2020 15:14    Routing History     Priority  Sent On  From  To  Message Type     10/9/2020  5:32 PM  Emre, Mhpn Incoming Lab Results From Edwardo Chavarria MD  Results    Click to Print Result    View [facility]     Surgical Pathology (Order #[id number]) on 10/6/20    Order Report      Order Details         Nm Lymphoscintigram    Result Date: 10/6/2020  EXAMINATION: LYMPHOSCINTIGRAPHY (INJECTION AND IMAGES) 10/6/2020 9:41 am TECHNIQUE: Sulfur colloid labeled with 600 mCi of Tc99 was provided to [facility] OF [facility] for sentinel lymph node localization. Patient was then taken to the OR for probe guided lymph node localization. After obtaining informed consent and under direct visual guidance, 4 equal injections for a total of 600 microcuries of Lymphoseek was injected subcutaneously into the 4 poles about the left nipple to aid in the patient's left sentinel node biopsy.   Nuclear images obtained after the isotope injection showed the Lymphoseek localized within the 4 poles about the left nipple with some extension of the radioisotope into the left axilla HISTORY: ORDERING SYSTEM PROVIDED HISTORY: History of left breast cancer TECHNOLOGIST PROVIDED HISTORY: Left Breast CA Reason for Exam: Hx of left breast cancer     Uncomplicated left periareolar injection of Lymphoseek to aid in left sentinel node biopsy. Lymph nodes localized with probe in the OR. [facility] Breast Specimen    Result Date: 10/6/2020  EXAMINATION: SPECIMEN RADIOGRAPH 10/6/2020 TECHNIQUE: Single radiograph of the surgical specimen was obtained intraoperatively. COMPARISON: Post wire localization mammogram October 6, 2020 HISTORY: Specimen radiograph post lumpectomy. ORDERING SYSTEM PROVIDED HISTORY: breast cancer TECHNOLOGIST PROVIDED HISTORY: breast cancer FINDINGS: Specimen radiograph is submitted intraoperatively. 4 biopsy markers are in the specimen The 2 biopsy clip coordinates are F 3/4, 3 and 2.5/D and F.5/0. The  F3/4, 3 localization wire tip is at the specimen edge. .  There appears to be abundant breast tissue about the 2.5/D wire tip The 2 wire tips are intact     Successful surgical excision of 2 localization wires and 4 biopsy clips The F3/4, 3 localization wire tip is at the specimen edge. Findings called to the operating room at 11:10 on 10/6/2020     [facility] Digital Diagnostic W Or Wo Cad Left    Result Date: 10/6/2020  EXAMINATION: SONOGRAPHIC GUIDED NEEDLE LOCALIZATION OF 2 LEFT BREAST SITES POSTPROCEDURE LEFT BREAST MAMMOGRAM 10/6/2020 8:27 am TECHNIQUE: A timeout was performed to confirm patient identification of the 2 left breast sites. Risks, benefits, and alternatives of the procedure were discussed. Informed written consent was obtained. The patient's left breast was prepped in the standard fashion and 1% lidocaine was used for local anesthesia. Each of the 2 localization wires were advanced under sonographic guidance to the 1 o'clock and 2 o'clock left breast sites and a localization wire was deployed at each site Total wire length of each wire was 7 cm The patient tolerated the procedure well with no immediate complications.  Postprocedure mammogram was performed and the patient was then transferred to the surgical preoperative area for continued care COMPARISON: Mammogram/sonogram July 2020 HISTORY: ORDERING SYSTEM PROVIDED HISTORY: mass of left breast TECHNOLOGIST PROVIDED HISTORY: Assess 1 o'clock left breast mass and abnormal cystic lesion 2 o'clock left breast FINDINGS: The postprocedure left breast mammogram confirmed good needle/wire localization of the 2 left breast wires     Successful placement of 2 localization wires in preparation for left breast surgery     Us Place Breast Loc Device 1st Lesion Left    Result Date: 10/6/2020  EXAMINATION: SONOGRAPHIC GUIDED NEEDLE LOCALIZATION OF 2 LEFT BREAST SITES POSTPROCEDURE LEFT BREAST MAMMOGRAM 10/6/2020 8:27 am TECHNIQUE: A timeout was performed to confirm patient identification of the 2 left breast sites. Risks, benefits, and alternatives of the procedure were discussed. Informed written consent was obtained. The patient's left breast was prepped in the standard fashion and 1% lidocaine was used for local anesthesia. Each of the 2 localization wires were advanced under sonographic guidance to the 1 o'clock and 2 o'clock left breast sites and a localization wire was deployed at each site Total wire length of each wire was 7 cm The patient tolerated the procedure well with no immediate complications. Postprocedure mammogram was performed and the patient was then transferred to the surgical preoperative area for continued care COMPARISON: Mammogram/sonogram July 2020 HISTORY: ORDERING SYSTEM PROVIDED HISTORY: mass of left breast TECHNOLOGIST PROVIDED HISTORY: Assess 1 o'clock left breast mass and abnormal cystic lesion 2 o'clock left breast FINDINGS: The postprocedure left breast mammogram confirmed good needle/wire localization of the 2 left breast wires     Successful placement of 2 localization wires in preparation for left breast surgery     Diagnosis:      1. Invasive ductal carcinoma. Plans:     1.  Re-excision left breast lumpectomy        FLORENCE Lopez CNP  10/20/2020  2:39 PM

## 2020-10-20 NOTE — H&P (VIEW-ONLY)
History and Physical Service   [facility]    HISTORY AND PHYSICAL EXAMINATION            Date of Evaluation: 10/20/2020  Patient name:  Torrie Ludwig  MRN:   [id number]  YOB: 1945  PCP:    Adair Whitt MD    History Obtained From:     Patient, medical records    History of Present Illness: This is Torrie Ludwig a 66 y.o. female who presernts today for a re-excision left breast lumpectomy by Dr Noris Wood for invasive ductal carcinoma. .Patient is s/p left breast needle localization, lumpectomy with left sentinel node biopsy and lymph node biopsy 10/6/20 by Dr Noris Wood for left breast cancer. Patient did well postoperatively with her procedure. Follow up with Dr Mulu Haque on 10/15/20 Discussed pathology results of biopsy with close margins and need for additional surgery. ER: Positive (95%, strong intensity). MN: Positive (40%, strong intensity) and    HER-2 FISH: Negative . Patient returns for her procedure. She denies a fever, chills, cough, increased SOB, wheezing, or chest pain  Denies open sores or rash. Last ASA 81mg 10/13/20     Past Medical History:     Past Medical History:   Diagnosis Date    Arthritis     Cancer ([address]     breast    Cardiac murmur     Depression     GERD (gastroesophageal reflux disease)     Heart palpitations     with anxiety    Hyperlipidemia     Hypertension     LBBB (left bundle branch block)     Per the pt's medical record.  Migraine     PVC (premature ventricular contraction)     per the pt's medical record        Past Surgical History:     Past Surgical History:   Procedure Laterality Date    BREAST LUMPECTOMY Left 10/6/2020    LEFT UPPER OUTER QUADRANT LUMPECTOMY.  WITH SENTINEL LYMPH NODE BIOPSY,  AXILLARY LYMPH NODE DISSECTION performed by Noris Wood MD at [address] [place] [address]      x 2     COLONOSCOPY      JOINT REPLACEMENT Bilateral     knee    US BREAST BIOPSY NEEDLE ADDITIONAL LEFT  8/6/2020 US BREAST BIOPSY NEEDLE ADDITIONAL LEFT 8/6/2020 [facility]    US BREAST NEEDLE BIOPSY LEFT  8/6/2020    US BREAST NEEDLE BIOPSY LEFT 8/6/2020 [facility]    US GUIDED NEEDLE LOC OF LEFT BREAST  10/6/2020    US GUIDED NEEDLE LOC OF LEFT BREAST 10/6/2020 [facility]        Medications Prior to Admission:     Prior to Admission medications    Medication Sig Start Date End Date Taking? Authorizing Provider   sennosides-docusate sodium (SENOKOT-S) 8.6-50 MG tablet Take 1 tablet by mouth daily 10/6/20   Ivana Kyle,    ondansetron (ZOFRAN) 4 MG tablet Take 1 tablet by mouth every 8 hours as needed for Nausea or Vomiting 10/6/20   Ivana Kyle, DO   propranolol (INDERAL LA) 160 MG extended release capsule Take 160 mg by mouth daily    Historical Provider, MD   sertraline (ZOLOFT) 100 MG tablet Take 100 mg by mouth daily    Historical Provider, MD   aspirin 81 MG chewable tablet Take 81 mg by mouth every other day     Historical Provider, MD   atorvastatin (LIPITOR) 40 MG tablet Take 40 mg by mouth daily  10/1/17   Historical Provider, MD   lisinopril-hydrochlorothiazide (PRINZIDE;ZESTORETIC) 20-12.5 MG per tablet Take 1 tablet by mouth daily  8/23/17   Historical Provider, MD   omeprazole (PRILOSEC) 20 MG delayed release capsule Take 20 mg by mouth Daily  10/20/18   Historical Provider, MD   SUMAtriptan (IMITREX) 100 MG tablet Take 100 mg by mouth once as needed  8/31/18   Historical Provider, MD        Allergies:     Patient has no known allergies. Social History:     Tobacco:    reports that she has quit smoking. She has never used smokeless tobacco.  Alcohol:      reports current alcohol use of about 7.0 standard drinks of alcohol per week. Drug Use:  reports no history of drug use. Family History:     Family History   Problem Relation Age of Onset    Heart Attack Mother     Liver Cancer Sister        Review of Systems:     Positive and Negative as described in HPI.     CONSTITUTIONAL: negative for fevers, chills, sweats, fatigue, weight loss  HEENT:  negative for vision, hearing changes, runny nose, throat pain  RESPIRATORY:  negative for shortness of breath, cough, congestion, wheezing. CARDIOVASCULAR:  negative for chest pain, palpitations. GASTROINTESTINAL:  negative for nausea, vomiting, diarrhea, constipation, change in bowel habits, abdominal pain   GENITOURINARY:  negative for difficulty of urination, burning with urination, frequency   BREAST: See HPI  INTEGUMENT:  negative for rash, skin lesions, easy bruising   HEMATOLOGIC/LYMPHATIC:  negative for swelling/edema   ALLERGIC/IMMUNOLOGIC:  negative for urticaria , itching  ENDOCRINE:  negative increase in drinking, increase in urination, hot or cold intolerance  MUSCULOSKELETAL:  negative joint pains, muscle aches, swelling of joints  NEUROLOGICAL:  negative for headaches, dizziness, lightheadedness, numbness, pain, tingling extremities  BEHAVIOR/PSYCH:  negative for depression, anxiety    Physical Exam:   BP (!) 147/98   Pulse 76   Temp 96.8 °F (36 °C) (Temporal)   Resp 14   Ht 5' 6\" (1.676 m)   Wt 174 lb (78.9 kg)   SpO2 94%   BMI 28.08 kg/m²   No LMP recorded. Patient is postmenopausal.  No results for input(s): POCGLU in the last 72 hours. General Appearance:  alert, well appearing, and in no acute distress  Mental status: oriented to person, place, and time with normal affect  Head:  normocephalic, atraumatic.   Eye: no icterus, redness, pupils equal and reactive, extraocular eye movements intact, conjunctiva clear  Ear: normal external ear, no discharge, hearing intact  Nose:  no drainage noted  Mouth: mucous membranes moist  Neck: supple, no carotid bruits, thyroid not palpable  Breast: closed healing surgical incision w/o redness, warmth or drainage  Lungs: Bilateral equal air entry, clear to ausculation, no wheezing, rales or rhonchi, normal effort  Cardiovascular: HR 76  normal rate, regular rhythm, no murmur, gallop, STRONG INTENSITY). RI: POSITIVE (40%, STRONG INTENSITY). HER-2 FISH: NEGATIVE. Milad Vann M.D   **Electronically Signed Out**         charlene/10/7/2020        Clinical Information   Pre-op Diagnosis:  LEFT BR CA   Operative Findings:  LEFT BREAST LUMPECTOMY  SHORT STITCH SUPERIOR,   LONG STITCH LATERAL; SENTINEL LYMPH NODE OF LEFT BREAST; WHITE STITCH   LOPEZ NEW INFERIOR MARGIN LEFT BREAST   Operation Performed:  LEFT BREAST NEEDLE LOC, LUMPECTOMY WITH LEFT   SENTINEL NODE BIOPSY, POSSIBLE AXILLARY LYMPH NODE DISSECTION     Source of Specimen   1: LEFT BREAST LUMPECTOMY   2: SENTINEL LYMPH NODE OF LEFT BREAST   3: WHITE STITCH MARKS NEW INFERIOR MARGIN LEFT BREAST     Gross Description   1. \"ZAY BRUNOKLEY, LEFT BREAST LUMPECTOMY\" Received fresh, is a 9.8   x 8.5 x 3.8 cm (M-L x S-I x A-P) oriented portion of focally disrupted   fibrofatty tissue with two localization wires. Surmounted, is a 4.0 x   1.5 cm unremarkable tan skin ellipse. The specimen is oriented with   the superior margin inked blue, anterior green, deep black and   inferior red. Sectioning reveals a 2.8 x 2.6 x 2.5 cm dense tan-white   mass lesion that is 0.1 cm from the superior anterior margin and 0.5   cm from the deep margin and at least 1.0 cm from the remaining   margins. The mass is centrally cavitary consistent with previous   biopsy. Dense focally gritty fibrous tissue focally extends from the   mass to involve the anterior, superior, deep, inferior and medial   margins. The skin is unremarkable. The uninvolved cut surfaces are   focally microcystic. No other discrete lesions are seen. Cassette   summary:  \"A-L\" representative sections sequentially submitted from   medial to lateral with the medial and lateral margins submitted as   shaves and the remaining margins perpendicular. Skin is in \"I\" and   the lesion is in \"B-G. \"  All six margins are represented. 2.   \"ZAY PRUETT, SENTINEL LYMPH NODE OF LEFT BREAST\" 3.5 cm   portion of fat within which is a 2.8 x 2.0 x 1.0 cm rubbery fatty   node. Node entirely 2cs. 3. \"ZAY SEBLE, WHITE STITCH MARKS NEW INFERIOR MARGIN LEFT   BREAST\" 6.0 x 3.2 x 2.0 cm oriented portion of fibrofatty tissue with   a suture designating new inferior margin. This margin is inked black   and opposite surface blue. Sectioning reveals slightly dense   ill-defined fibrous tissue that extends to the new inferior margin. Representative sections 4cs with the new inferior margin   perpendicular. tm       Microscopic Description   1-3. Microscopic examination performed. INVASIVE BREAST CARCINOMA         PROCEDURE: Left breast lumpectomy, sentinel lymph node of left breast,   new anterior margin of left breast.                                     SPECIMEN LATERALITY & TUMOR SITE: Left, not otherwise specified. TUMOR SIZE (LARGEST INVASIVE COMPONENT): 28.0 mm. HISTOLOGIC TYPE OF INVASIVE CARCINOMA: Invasive ductal carcinoma   (Invasive carcinoma of no special type-ductal). HISTOLOGIC GRADE (EVANS)--       - GLANDULAR/TUBULAR DIFFERENTIATION SCORE: 3.       - NUCLEAR PLEOMORPHISM SCORE: 2.       - MITOTIC RATE SCORE: 2.       - OVERALL GRADE (FROM EVANS TOTAL SCORE): Grade 2 (Moderately   differentiated). DUCTAL CARCINOMA IN SITU: Present (Negative for extensive intraductal   component). -SIZE: 20.0 mm [DCIS is present in five nonsequential sections and the   estimated size is determined by number of blocks with DCIS (5 blocks)   x 4.0 mm (thickness of tissue) = 20.0 mm]. -ARCHITECTURAL PATTERNS: Cribriform. -NUCLEAR GRADE: Intermediate grade. -NECROSIS: Present, central (expansive \"comedo\" necrosis). TUMOR EXTENSION       - SKIN:     Skin is present and uninvolved. - NIPPLE: Nipple is not present. - SKELETAL MUSCLE:  No skeletal muscle present.          MARGINS - INVASIVE CARCINOMA -   - SITE(S) OF ALL POSITIVE MARGINS: The carcinoma extends to the   superior margin focally (linear extent < 1.0 mm). The anterior margin   is partially fragmented and heavily cauterized and the carcinoma   extends to multiple foci with the largest linear extent of 5.0 mm. (The carcinoma is 8.0 mm from the posterior/deep margin and 17.0 mm   from the inferior margin in the lumpectomy specimen). - IF ALL NEGATIVE--   --SITE(S) AND DISTANCE TO CLOSEST: N/A.   MARGINS - DCIS -   - SITE(S) OF ALL POSITIVE MARGINS: N/A.   - IF ALL NEGATIVE--   --SITE(S) AND DISTANCE TO CLOSEST:      DCIS is 8.0 mm from the   anterior and posterior margins. LYMPHOVASCULAR INVASION: Not identified. REGIONAL LYMPH NODES -       - NUMBER EXAMINED (SENTINEL AND NON-SENTINEL): 1.       - NUMBER OF SENTINEL: 1.         - NUMBER WITH MACROMETASTASES: N/A.       - NUMBER WITH MICROMETASTASES:      N/A.   - NUMBER WITH ISOLATED TUMOR CELLS: N/A.       - SIZE OF LARGEST METASTATIC DEPOSIT: N/A.       - EXTRANODAL EXTENSION: N/A.       TREATMENT EFFECT: RESPONSE TO PRE-SURGICAL   (NEOADJUVANT) THERAPY (IF APPLICABLE)--       - IN BREAST TISSUES: No known presurgical therapy. - IN LYMPH NODES: No known presurgical therapy. DISTANT METASTASIS pM   (only if confirmed pathologically in this case): N/A. PATHOLOGIC STAGE CLASSIFICATION (pTNM):     pT2  pN0   (T and /or N descriptors used only if applicable)     CAP InvasiveBreast 4400 in conjunction with AJCC 8 ed. BIOMARKER TESTING  BREAST CARCINOMA         ESTROGEN RECEPTOR*--   -POSITIVE (PERCENT =>10% OF POSITIVE TUMOR CELL NUCLEI): See comment   above. ---AVERAGE INTENSITY OF POSITIVE STAINING: See comment above. -LOW POSITIVE (PERCENT 1-10% OF POSITIVE TUMOR CELL NUCLEI): See   comment above. ---AVERAGE INTENSITY OF POSITIVE STAINING: See comment above. -NEGATIVE (PERCENT <1% OF POSITIVE TUMOR CELL NUCLEI): See comment   above.        ---AVERAGE INTENSITY OF ANY POSITIVE STAINING: See comment above.   ---INTERNAL CONTROL PRESENT AND STAIN AS EXPECTED:     See comment   above.   ---INTERNAL CONTROL CELLS ABSENT (COMMENT):     See comment above.   ---INTERNAL CONTROL CELLS PRESENT BUT DO NOT STAIN (COMMENT): See   comment above. PROGESTERONE RECEPTOR*--   -POSITIVE (PERCENT =>1% OF POSITIVE TUMOR CELL NUCLEI): See comment   above. ---AVERAGE INTENSITY OF POSITIVE STAINING: See comment above. -NEGATIVE (PERCENT <1% OF POSITIVE TUMOR CELL NUCLEI): See comment   above. ---AVERAGE INTENSITY OF ANY POSITIVE STAINING: See comment above.   ---INTERNAL CONTROL PRESENT AND STAIN AS EXPECTED: See comment above.        ---INTERNAL CONTROL CELLS ABSENT (COMMENT): See comment above.       ---INTERNAL CONTROL CELLS PRESENT BUT DO NOT STAIN (COMMENT): See   comment above. HER2*--   HER2 PROTEIN EXPRESSION (IMMUNOHISTOCHEMISTRY):     N/A   HER2 GENE AMPLIFICATION (INSITU HYBRIDIZATION):     See comment above. COLD ISCHEMIA TIME (TARGET UP TO 60 MIN): 32 minutes. FIXATION TIME (TARGET 6-72 HRS): 12 hours. *Information on biomarker regents:   Estrogen Receptor: If performed at [place] [address] laboratory: Immunostain   clone SP1 with indirect biotin streptavidin detection system, vendor   Herlinda Gould (FDA cleared); evaluated by manual morphometry   (negative=less than 1% tumor cell nuclear staining; otherwise   positive); external control is reactive. Progesterone Receptor: If performed at [place] [address] laboratory: Immunostain   clone 1E2 with indirect biotin streptavidin detection system, vendor   Herlinda Gould (FDA cleared); evaluated by manual morphometry   (negative=less than 1% tumor cell nuclear staining; otherwise   positive); external control is reactive. HER 2 insitu hybridization: FDA cleared, vendor [facility] [employer]. CAP BreastBiomarkers 1400 in conjunction with AJCC 8 ed.         SURGICAL PATHOLOGY CONSULTATION        Patient Name: Gregory Aguilar, ZAY STOUT [facility] Rec: [id number]   Path Number: [id number]     [address], [facility] [address]. [facility], [address]   [phone]   Fax: [phone]    Testing Performed By     Olesya Chiang  Name  Director  Address  Valid Date Range    [address], [place], [address] [facility] [address]  08/30/17 0801-Present    Lab and Collection     Surgical Pathology - 10/6/2020   Result Information     Status: Final result (Collected: 10/6/2020 14:04)  Provider Status: Reviewed    All Reviewers List     Kay Goff MD on 10/12/2020 15:14    Routing History     Priority  Sent On  From  To  Message Type     10/9/2020  5:32 PM  Emre, [facility] Incoming Lab Results From Lianet Alvarenga MD  Results    Click to Print Result    View [facility]     Surgical Pathology (Order #[id number]) on 10/6/20    Order Report      Order Details         Nm Lymphoscintigram    Result Date: 10/6/2020  EXAMINATION: LYMPHOSCINTIGRAPHY (INJECTION AND IMAGES) 10/6/2020 9:41 am TECHNIQUE: Sulfur colloid labeled with 600 mCi of Tc99 was provided to [facility] for sentinel lymph node localization. Patient was then taken to the OR for probe guided lymph node localization. After obtaining informed consent and under direct visual guidance, 4 equal injections for a total of 600 microcuries of Lymphoseek was injected subcutaneously into the 4 poles about the left nipple to aid in the patient's left sentinel node biopsy.   Nuclear images obtained after the isotope injection showed the Lymphoseek localized within the 4 poles about the left nipple with some extension of the radioisotope into the left axilla HISTORY: ORDERING SYSTEM PROVIDED HISTORY: History of left breast cancer TECHNOLOGIST PROVIDED HISTORY: Left Breast CA Reason for Exam: Hx of left breast cancer     Uncomplicated left periareolar injection of

## 2020-10-20 NOTE — ANESTHESIA POSTPROCEDURE EVALUATION
Department of Anesthesiology  Postprocedure Note    Patient: Rah Vu  MRN: [id number]  YOB: 1945  Date of evaluation: 10/20/2020  Time:  5:51 PM     Procedure Summary     Date:  10/20/20 Room / Location:  [address] [place] [address] - INPATIENT    Anesthesia Start:  6443 Anesthesia Stop:  1628    Procedure:  REEXCISION LEFT BREAST LUMPECTOMY (Left Breast) Diagnosis:  (DX LEFT BREAST CA)    Surgeon:  Nanette Ramirez MD Responsible Provider:  Elliott Anna MD    Anesthesia Type:  general ASA Status:  2          Anesthesia Type: general    Harjinder Phase I: Harjinder Score: 10    Harjinder Phase II: Harjinder Score: 10    Last vitals: Reviewed and per EMR flowsheets.        Anesthesia Post Evaluation    Patient location during evaluation: PACU  Patient participation: complete - patient participated  Level of consciousness: awake  Airway patency: patent  Nausea & Vomiting: no nausea  Complications: no  Cardiovascular status: blood pressure returned to baseline  Respiratory status: acceptable  Hydration status: euvolemic

## 2020-10-20 NOTE — OP NOTE
Operative Note      Patient: Torrie Ludwig  YOB: 1945  MRN: [id number]    Date of Procedure: 10/20/2020    Pre-Op Diagnosis: Left breast cancer, positive superior and anterior margins from lumpectomy    Post-Op Diagnosis: Same       Procedure(s):  REEXCISION LEFT BREAST LUMPECTOMY    Surgeon(s):  Noris Wood MD    Assistant:   Resident: Jenna Delgado DO    Anesthesia: General    Estimated Blood Loss (mL): Minimal    Complications: None    Specimens:   ID Type Source Tests Collected by Time Destination   A : NEW ANTERIOR MARGIN- SCAR Tissue Breast SURGICAL PATHOLOGY Ivana AGUILAR Imel, DO 10/20/2020 1537    B : SHORT STITCH MARKS NEW SUPERIOR MARGIN LONG STITCH MARKS NEW MEDIAL MARGIN Tissue Breast SURGICAL PATHOLOGY Ivana AGUILAR Imel, DO 10/20/2020 1546    C : WHITE STITCH MARKS ADDITIONAL MEDIAL MARGIN LEFT BREAST Tissue Breast SURGICAL PATHOLOGY Lorena Lovelace el, DO 10/20/2020 1548        Implants:  * No implants in log *      Drains: * No LDAs found *    Findings: Additional anterior and superior margins sent for pathology    Indications for procedure: This is a very pleasant 57-year-old female with a recently diagnosed left breast cancer. The patient had a previous lumpectomy performed. The patient had evidence of residual tumor at the superior and anterior margins. The risks and benefits of reexcision lumpectomy were discussed with the patient and verbal and written consent was obtained. Detailed Description of Procedure:   After verbal and written consent, the patient was brought to the operating room. After appropriate monitoring, general anesthesia was administered. A timeout was performed with the staff in the room confirming both the patient and the procedure to be performed. The procedure was begun with a sterile prep and drape. Local anesthesia was infiltrated into the skin and subcutaneous tissue was a field block. An elliptical incision was made with a scalpel.   Dissection was carried down through the subcutaneous tissue with cautery. New anterior and superior/medial margins were then excised. These were sent for permanent pathologic evaluation. Once hemostasis was assured, the incision was closed in layers with interrupted Vicryl suture used to close deep dermal tissue and a running Monocryl suture used to close the skin. Dermabond was applied to the wound. The sponge, lap, needle, and instrument count were correct at the end of the case. The patient was awakened from anesthesia and transported to the recovery in stable condition. There were no immediate complications.     Electronically signed by Donell Oropeza MD on 10/20/2020 at 3:50 PM

## 2020-10-23 LAB — SURGICAL PATHOLOGY REPORT: NORMAL

## 2020-11-03 ENCOUNTER — ANESTHESIA (OUTPATIENT)
Dept: OPERATING ROOM | Age: 75
End: 2020-11-03
Payer: MEDICARE

## 2020-11-03 ENCOUNTER — HOSPITAL ENCOUNTER (OUTPATIENT)
Age: 75
Setting detail: OBSERVATION
Discharge: HOME HEALTH | End: 2020-11-04
Attending: SURGERY | Admitting: SURGERY
Payer: MEDICARE

## 2020-11-03 ENCOUNTER — ANESTHESIA EVENT (OUTPATIENT)
Dept: OPERATING ROOM | Age: 75
End: 2020-11-03
Payer: MEDICARE

## 2020-11-03 VITALS — DIASTOLIC BLOOD PRESSURE: 84 MMHG | SYSTOLIC BLOOD PRESSURE: 185 MMHG | OXYGEN SATURATION: 92 % | TEMPERATURE: 97.5 F

## 2020-11-03 PROBLEM — C77.9 PRIMARY CANCER OF LEFT BREAST WITH STAGE 2 NODAL METASTASIS PER AMERICAN JOINT COMMITTEE ON CANCER 7TH EDITION (N2) (HCC): Status: ACTIVE | Noted: 2020-11-03

## 2020-11-03 PROBLEM — C50.912 PRIMARY CANCER OF LEFT BREAST WITH STAGE 2 NODAL METASTASIS PER AMERICAN JOINT COMMITTEE ON CANCER 7TH EDITION (N2) (HCC): Status: ACTIVE | Noted: 2020-11-03

## 2020-11-03 PROCEDURE — 2720000010 HC SURG SUPPLY STERILE: Performed by: SURGERY

## 2020-11-03 PROCEDURE — 7100000001 HC PACU RECOVERY - ADDTL 15 MIN: Performed by: SURGERY

## 2020-11-03 PROCEDURE — 6360000002 HC RX W HCPCS: Performed by: ANESTHESIOLOGY

## 2020-11-03 PROCEDURE — 2580000003 HC RX 258: Performed by: ANESTHESIOLOGY

## 2020-11-03 PROCEDURE — 88307 TISSUE EXAM BY PATHOLOGIST: CPT

## 2020-11-03 PROCEDURE — 3700000001 HC ADD 15 MINUTES (ANESTHESIA): Performed by: SURGERY

## 2020-11-03 PROCEDURE — 3600000012 HC SURGERY LEVEL 2 ADDTL 15MIN: Performed by: SURGERY

## 2020-11-03 PROCEDURE — 7100000000 HC PACU RECOVERY - FIRST 15 MIN: Performed by: SURGERY

## 2020-11-03 PROCEDURE — G0378 HOSPITAL OBSERVATION PER HR: HCPCS

## 2020-11-03 PROCEDURE — 2580000003 HC RX 258: Performed by: STUDENT IN AN ORGANIZED HEALTH CARE EDUCATION/TRAINING PROGRAM

## 2020-11-03 PROCEDURE — 3700000000 HC ANESTHESIA ATTENDED CARE: Performed by: SURGERY

## 2020-11-03 PROCEDURE — 2500000003 HC RX 250 WO HCPCS: Performed by: NURSE ANESTHETIST, CERTIFIED REGISTERED

## 2020-11-03 PROCEDURE — 6370000000 HC RX 637 (ALT 250 FOR IP): Performed by: STUDENT IN AN ORGANIZED HEALTH CARE EDUCATION/TRAINING PROGRAM

## 2020-11-03 PROCEDURE — 6360000002 HC RX W HCPCS: Performed by: NURSE ANESTHETIST, CERTIFIED REGISTERED

## 2020-11-03 PROCEDURE — 2709999900 HC NON-CHARGEABLE SUPPLY: Performed by: SURGERY

## 2020-11-03 PROCEDURE — 3600000002 HC SURGERY LEVEL 2 BASE: Performed by: SURGERY

## 2020-11-03 PROCEDURE — 2500000003 HC RX 250 WO HCPCS: Performed by: ANESTHESIOLOGY

## 2020-11-03 RX ORDER — SODIUM CHLORIDE, SODIUM LACTATE, POTASSIUM CHLORIDE, CALCIUM CHLORIDE 600; 310; 30; 20 MG/100ML; MG/100ML; MG/100ML; MG/100ML
INJECTION, SOLUTION INTRAVENOUS CONTINUOUS
Status: DISCONTINUED | OUTPATIENT
Start: 2020-11-03 | End: 2020-11-03

## 2020-11-03 RX ORDER — MORPHINE SULFATE 2 MG/ML
2 INJECTION, SOLUTION INTRAMUSCULAR; INTRAVENOUS EVERY 4 HOURS PRN
Status: DISCONTINUED | OUTPATIENT
Start: 2020-11-03 | End: 2020-11-04

## 2020-11-03 RX ORDER — LISINOPRIL AND HYDROCHLOROTHIAZIDE 20; 12.5 MG/1; MG/1
1 TABLET ORAL DAILY
Status: DISCONTINUED | OUTPATIENT
Start: 2020-11-03 | End: 2020-11-04 | Stop reason: HOSPADM

## 2020-11-03 RX ORDER — PROMETHAZINE HYDROCHLORIDE 25 MG/ML
6.25 INJECTION, SOLUTION INTRAMUSCULAR; INTRAVENOUS
Status: DISCONTINUED | OUTPATIENT
Start: 2020-11-03 | End: 2020-11-03 | Stop reason: HOSPADM

## 2020-11-03 RX ORDER — HYDROCODONE BITARTRATE AND ACETAMINOPHEN 5; 325 MG/1; MG/1
1 TABLET ORAL EVERY 6 HOURS PRN
Status: DISCONTINUED | OUTPATIENT
Start: 2020-11-03 | End: 2020-11-04 | Stop reason: HOSPADM

## 2020-11-03 RX ORDER — HYDROMORPHONE HCL 110MG/55ML
0.5 PATIENT CONTROLLED ANALGESIA SYRINGE INTRAVENOUS EVERY 5 MIN PRN
Status: DISCONTINUED | OUTPATIENT
Start: 2020-11-03 | End: 2020-11-03 | Stop reason: HOSPADM

## 2020-11-03 RX ORDER — CEFAZOLIN SODIUM 1 G/3ML
INJECTION, POWDER, FOR SOLUTION INTRAMUSCULAR; INTRAVENOUS PRN
Status: DISCONTINUED | OUTPATIENT
Start: 2020-11-03 | End: 2020-11-03 | Stop reason: SDUPTHER

## 2020-11-03 RX ORDER — OXYCODONE HYDROCHLORIDE AND ACETAMINOPHEN 5; 325 MG/1; MG/1
1 TABLET ORAL PRN
Status: DISCONTINUED | OUTPATIENT
Start: 2020-11-03 | End: 2020-11-03 | Stop reason: HOSPADM

## 2020-11-03 RX ORDER — FENTANYL CITRATE 50 UG/ML
25 INJECTION, SOLUTION INTRAMUSCULAR; INTRAVENOUS EVERY 5 MIN PRN
Status: DISCONTINUED | OUTPATIENT
Start: 2020-11-03 | End: 2020-11-03 | Stop reason: HOSPADM

## 2020-11-03 RX ORDER — ONDANSETRON 2 MG/ML
4 INJECTION INTRAMUSCULAR; INTRAVENOUS EVERY 6 HOURS PRN
Status: DISCONTINUED | OUTPATIENT
Start: 2020-11-03 | End: 2020-11-04 | Stop reason: HOSPADM

## 2020-11-03 RX ORDER — FENTANYL CITRATE 50 UG/ML
INJECTION, SOLUTION INTRAMUSCULAR; INTRAVENOUS PRN
Status: DISCONTINUED | OUTPATIENT
Start: 2020-11-03 | End: 2020-11-03 | Stop reason: SDUPTHER

## 2020-11-03 RX ORDER — SENNOSIDES 8.6 MG
1 TABLET ORAL DAILY
Qty: 120 TABLET | Refills: 0 | Status: SHIPPED | OUTPATIENT
Start: 2020-11-03

## 2020-11-03 RX ORDER — HYDRALAZINE HYDROCHLORIDE 20 MG/ML
5 INJECTION INTRAMUSCULAR; INTRAVENOUS EVERY 10 MIN PRN
Status: DISCONTINUED | OUTPATIENT
Start: 2020-11-03 | End: 2020-11-03 | Stop reason: HOSPADM

## 2020-11-03 RX ORDER — ONDANSETRON 2 MG/ML
4 INJECTION INTRAMUSCULAR; INTRAVENOUS
Status: DISCONTINUED | OUTPATIENT
Start: 2020-11-03 | End: 2020-11-03 | Stop reason: HOSPADM

## 2020-11-03 RX ORDER — ONDANSETRON 4 MG/1
4 TABLET, FILM COATED ORAL EVERY 8 HOURS PRN
Qty: 30 TABLET | Refills: 0 | Status: SHIPPED | OUTPATIENT
Start: 2020-11-03

## 2020-11-03 RX ORDER — ATORVASTATIN CALCIUM 40 MG/1
40 TABLET, FILM COATED ORAL DAILY
Status: DISCONTINUED | OUTPATIENT
Start: 2020-11-03 | End: 2020-11-04 | Stop reason: HOSPADM

## 2020-11-03 RX ORDER — SENNA AND DOCUSATE SODIUM 50; 8.6 MG/1; MG/1
1 TABLET, FILM COATED ORAL DAILY
Status: DISCONTINUED | OUTPATIENT
Start: 2020-11-03 | End: 2020-11-04 | Stop reason: HOSPADM

## 2020-11-03 RX ORDER — KETAMINE HCL IN NACL, ISO-OSM 100MG/10ML
SYRINGE (ML) INJECTION PRN
Status: DISCONTINUED | OUTPATIENT
Start: 2020-11-03 | End: 2020-11-03 | Stop reason: SDUPTHER

## 2020-11-03 RX ORDER — OXYCODONE HYDROCHLORIDE AND ACETAMINOPHEN 5; 325 MG/1; MG/1
2 TABLET ORAL PRN
Status: DISCONTINUED | OUTPATIENT
Start: 2020-11-03 | End: 2020-11-03 | Stop reason: HOSPADM

## 2020-11-03 RX ORDER — LIDOCAINE HYDROCHLORIDE 20 MG/ML
INJECTION, SOLUTION EPIDURAL; INFILTRATION; INTRACAUDAL; PERINEURAL PRN
Status: DISCONTINUED | OUTPATIENT
Start: 2020-11-03 | End: 2020-11-03 | Stop reason: SDUPTHER

## 2020-11-03 RX ORDER — LABETALOL HYDROCHLORIDE 5 MG/ML
5 INJECTION, SOLUTION INTRAVENOUS EVERY 10 MIN PRN
Status: DISCONTINUED | OUTPATIENT
Start: 2020-11-03 | End: 2020-11-03 | Stop reason: HOSPADM

## 2020-11-03 RX ORDER — PROPRANOLOL HYDROCHLORIDE 80 MG/1
160 CAPSULE, EXTENDED RELEASE ORAL DAILY
Status: DISCONTINUED | OUTPATIENT
Start: 2020-11-03 | End: 2020-11-04 | Stop reason: HOSPADM

## 2020-11-03 RX ORDER — PROPOFOL 10 MG/ML
INJECTION, EMULSION INTRAVENOUS PRN
Status: DISCONTINUED | OUTPATIENT
Start: 2020-11-03 | End: 2020-11-03 | Stop reason: SDUPTHER

## 2020-11-03 RX ORDER — ONDANSETRON 2 MG/ML
INJECTION INTRAMUSCULAR; INTRAVENOUS PRN
Status: DISCONTINUED | OUTPATIENT
Start: 2020-11-03 | End: 2020-11-03 | Stop reason: SDUPTHER

## 2020-11-03 RX ORDER — DEXAMETHASONE SODIUM PHOSPHATE 10 MG/ML
INJECTION, SOLUTION INTRAMUSCULAR; INTRAVENOUS PRN
Status: DISCONTINUED | OUTPATIENT
Start: 2020-11-03 | End: 2020-11-03 | Stop reason: SDUPTHER

## 2020-11-03 RX ORDER — HYDROCODONE BITARTRATE AND ACETAMINOPHEN 5; 325 MG/1; MG/1
1 TABLET ORAL EVERY 6 HOURS PRN
Qty: 28 TABLET | Refills: 0 | Status: SHIPPED | OUTPATIENT
Start: 2020-11-03 | End: 2020-11-10

## 2020-11-03 RX ORDER — SODIUM CHLORIDE 0.9 % (FLUSH) 0.9 %
10 SYRINGE (ML) INJECTION PRN
Status: DISCONTINUED | OUTPATIENT
Start: 2020-11-03 | End: 2020-11-04 | Stop reason: HOSPADM

## 2020-11-03 RX ORDER — MIDAZOLAM HYDROCHLORIDE 1 MG/ML
INJECTION INTRAMUSCULAR; INTRAVENOUS PRN
Status: DISCONTINUED | OUTPATIENT
Start: 2020-11-03 | End: 2020-11-03 | Stop reason: SDUPTHER

## 2020-11-03 RX ORDER — SERTRALINE HYDROCHLORIDE 100 MG/1
100 TABLET, FILM COATED ORAL DAILY
Status: DISCONTINUED | OUTPATIENT
Start: 2020-11-03 | End: 2020-11-04 | Stop reason: HOSPADM

## 2020-11-03 RX ORDER — ONDANSETRON 4 MG/1
4 TABLET, ORALLY DISINTEGRATING ORAL EVERY 8 HOURS PRN
Status: DISCONTINUED | OUTPATIENT
Start: 2020-11-03 | End: 2020-11-04 | Stop reason: HOSPADM

## 2020-11-03 RX ORDER — SODIUM CHLORIDE 0.9 % (FLUSH) 0.9 %
10 SYRINGE (ML) INJECTION EVERY 12 HOURS SCHEDULED
Status: DISCONTINUED | OUTPATIENT
Start: 2020-11-03 | End: 2020-11-04 | Stop reason: HOSPADM

## 2020-11-03 RX ORDER — ACETAMINOPHEN 325 MG/1
650 TABLET ORAL EVERY 6 HOURS
Status: DISCONTINUED | OUTPATIENT
Start: 2020-11-03 | End: 2020-11-04 | Stop reason: HOSPADM

## 2020-11-03 RX ADMIN — ONDANSETRON 4 MG: 2 INJECTION, SOLUTION INTRAMUSCULAR; INTRAVENOUS at 13:05

## 2020-11-03 RX ADMIN — CEFAZOLIN 2000 MG: 1 INJECTION, POWDER, FOR SOLUTION INTRAMUSCULAR; INTRAVENOUS at 13:06

## 2020-11-03 RX ADMIN — Medication 50 MCG: at 13:10

## 2020-11-03 RX ADMIN — Medication 25 MG: at 13:29

## 2020-11-03 RX ADMIN — DEXAMETHASONE SODIUM PHOSPHATE 10 MG: 10 INJECTION INTRAMUSCULAR; INTRAVENOUS at 13:05

## 2020-11-03 RX ADMIN — SODIUM CHLORIDE, POTASSIUM CHLORIDE, SODIUM LACTATE AND CALCIUM CHLORIDE: 600; 310; 30; 20 INJECTION, SOLUTION INTRAVENOUS at 10:43

## 2020-11-03 RX ADMIN — LIDOCAINE HYDROCHLORIDE 100 MG: 20 INJECTION, SOLUTION EPIDURAL; INFILTRATION; INTRACAUDAL; PERINEURAL at 12:58

## 2020-11-03 RX ADMIN — LABETALOL HYDROCHLORIDE 5 MG: 5 INJECTION INTRAVENOUS at 14:25

## 2020-11-03 RX ADMIN — FENTANYL CITRATE 25 MCG: 50 INJECTION, SOLUTION INTRAMUSCULAR; INTRAVENOUS at 15:03

## 2020-11-03 RX ADMIN — Medication 10 ML: at 21:21

## 2020-11-03 RX ADMIN — ACETAMINOPHEN 650 MG: 325 TABLET ORAL at 18:48

## 2020-11-03 RX ADMIN — PROPOFOL 150 MG: 10 INJECTION, EMULSION INTRAVENOUS at 12:58

## 2020-11-03 RX ADMIN — ACETAMINOPHEN 650 MG: 325 TABLET ORAL at 23:32

## 2020-11-03 RX ADMIN — MIDAZOLAM 1 MG: 1 INJECTION INTRAMUSCULAR; INTRAVENOUS at 12:53

## 2020-11-03 RX ADMIN — Medication 25 MG: at 12:58

## 2020-11-03 RX ADMIN — Medication 50 MCG: at 12:58

## 2020-11-03 ASSESSMENT — PAIN DESCRIPTION - PAIN TYPE
TYPE: SURGICAL PAIN

## 2020-11-03 ASSESSMENT — PULMONARY FUNCTION TESTS
PIF_VALUE: 25
PIF_VALUE: 19
PIF_VALUE: 25
PIF_VALUE: 26
PIF_VALUE: 2
PIF_VALUE: 1
PIF_VALUE: 28
PIF_VALUE: 22
PIF_VALUE: 22
PIF_VALUE: 12
PIF_VALUE: 1
PIF_VALUE: 1
PIF_VALUE: 24
PIF_VALUE: 35
PIF_VALUE: 21
PIF_VALUE: 12
PIF_VALUE: 30
PIF_VALUE: 2
PIF_VALUE: 21
PIF_VALUE: 24
PIF_VALUE: 33
PIF_VALUE: 26
PIF_VALUE: 18
PIF_VALUE: 1
PIF_VALUE: 20
PIF_VALUE: 33
PIF_VALUE: 19
PIF_VALUE: 22
PIF_VALUE: 12
PIF_VALUE: 26
PIF_VALUE: 24
PIF_VALUE: 15
PIF_VALUE: 17
PIF_VALUE: 0
PIF_VALUE: 0
PIF_VALUE: 19
PIF_VALUE: 15
PIF_VALUE: 16
PIF_VALUE: 2
PIF_VALUE: 2
PIF_VALUE: 26
PIF_VALUE: 25
PIF_VALUE: 15
PIF_VALUE: 12
PIF_VALUE: 26
PIF_VALUE: 21
PIF_VALUE: 1
PIF_VALUE: 1
PIF_VALUE: 2
PIF_VALUE: 26
PIF_VALUE: 25
PIF_VALUE: 14
PIF_VALUE: 26
PIF_VALUE: 25
PIF_VALUE: 12
PIF_VALUE: 12
PIF_VALUE: 1
PIF_VALUE: 23
PIF_VALUE: 15
PIF_VALUE: 26
PIF_VALUE: 31
PIF_VALUE: 18
PIF_VALUE: 26
PIF_VALUE: 0
PIF_VALUE: 12
PIF_VALUE: 15
PIF_VALUE: 0
PIF_VALUE: 26
PIF_VALUE: 12
PIF_VALUE: 14
PIF_VALUE: 12

## 2020-11-03 ASSESSMENT — PAIN DESCRIPTION - PROGRESSION
CLINICAL_PROGRESSION: GRADUALLY WORSENING
CLINICAL_PROGRESSION: GRADUALLY WORSENING
CLINICAL_PROGRESSION: GRADUALLY IMPROVING

## 2020-11-03 ASSESSMENT — PAIN DESCRIPTION - ORIENTATION
ORIENTATION: LEFT

## 2020-11-03 ASSESSMENT — PAIN SCALES - GENERAL
PAINLEVEL_OUTOF10: 2
PAINLEVEL_OUTOF10: 0
PAINLEVEL_OUTOF10: 0
PAINLEVEL_OUTOF10: 1
PAINLEVEL_OUTOF10: 5
PAINLEVEL_OUTOF10: 1
PAINLEVEL_OUTOF10: 1
PAINLEVEL_OUTOF10: 3
PAINLEVEL_OUTOF10: 2
PAINLEVEL_OUTOF10: 1
PAINLEVEL_OUTOF10: 0

## 2020-11-03 ASSESSMENT — PAIN DESCRIPTION - ONSET
ONSET: GRADUAL
ONSET: ON-GOING
ONSET: PROGRESSIVE
ONSET: GRADUAL

## 2020-11-03 ASSESSMENT — PAIN DESCRIPTION - DESCRIPTORS
DESCRIPTORS: BURNING

## 2020-11-03 ASSESSMENT — PAIN DESCRIPTION - FREQUENCY
FREQUENCY: CONTINUOUS
FREQUENCY: CONTINUOUS
FREQUENCY: INTERMITTENT
FREQUENCY: INTERMITTENT
FREQUENCY: CONTINUOUS
FREQUENCY: CONTINUOUS

## 2020-11-03 ASSESSMENT — PAIN DESCRIPTION - LOCATION
LOCATION: BREAST
LOCATION: CHEST

## 2020-11-03 ASSESSMENT — PAIN - FUNCTIONAL ASSESSMENT
PAIN_FUNCTIONAL_ASSESSMENT: 0-10
PAIN_FUNCTIONAL_ASSESSMENT: ACTIVITIES ARE NOT PREVENTED

## 2020-11-03 NOTE — ANESTHESIA POSTPROCEDURE EVALUATION
Department of Anesthesiology  Postprocedure Note    Patient: Rah Vu  MRN: [id number]  YOB: 1945  Date of evaluation: 11/3/2020  Time:  4:12 PM     Procedure Summary     Date:  11/03/20 Room / Location:  [address] [place] [address]    Anesthesia Start:  8923 Anesthesia Stop:  2132    Procedure:  LEFT BREAST MASTECTOMY (Left ) Diagnosis:  (DX LEFT BREAST CA)    Surgeon:  Nanette Ramirez MD Responsible Provider:  Alejandra Arthur DO    Anesthesia Type:  general ASA Status:  3          Anesthesia Type: general    Harjinder Phase I: Harjinder Score: 10    Harjinder Phase II:      Last vitals: Reviewed and per EMR flowsheets.        Anesthesia Post Evaluation    Patient location during evaluation: PACU  Level of consciousness: awake and alert  Complications: no

## 2020-11-03 NOTE — ANESTHESIA PRE PROCEDURE
Department of Anesthesiology  Preprocedure Note       Name:  Dirk Meckel   Age:  66 y.o.  :  1945                                          MRN:  [id number]         Date:  11/3/2020      Surgeon: Liam Cm):  Barb Reynaga MD    Procedure: Procedure(s):  LEFT BREAST MASTECTOMY    Medications prior to admission:   Prior to Admission medications    Medication Sig Start Date End Date Taking? Authorizing Provider   sennosides-docusate sodium (SENOKOT-S) 8.6-50 MG tablet Take 1 tablet by mouth daily 10/6/20  Yes Ivana Kyle DO   propranolol (INDERAL LA) 160 MG extended release capsule Take 160 mg by mouth daily   Yes Historical Provider, MD   sertraline (ZOLOFT) 100 MG tablet Take 100 mg by mouth daily   Yes Historical Provider, MD   aspirin 81 MG chewable tablet Take 81 mg by mouth every other day    Yes Historical Provider, MD   atorvastatin (LIPITOR) 40 MG tablet Take 40 mg by mouth daily  10/1/17  Yes Historical Provider, MD   lisinopril-hydrochlorothiazide (PRINZIDE;ZESTORETIC) 20-12.5 MG per tablet Take 1 tablet by mouth daily  17  Yes Historical Provider, MD   ondansetron (ZOFRAN) 4 MG tablet Take 1 tablet by mouth every 8 hours as needed for Nausea or Vomiting 10/20/20   Ivana Kyle DO   ondansetron (ZOFRAN) 4 MG tablet Take 1 tablet by mouth every 8 hours as needed for Nausea or Vomiting 10/6/20   Ivana Kyle DO   SUMAtriptan (IMITREX) 100 MG tablet Take 100 mg by mouth once as needed  18   Historical Provider, MD       Current medications:    Current Facility-Administered Medications   Medication Dose Route Frequency Provider Last Rate Last Dose    lactated ringers infusion   Intravenous Continuous Eh [place],  mL/hr at 20 1043         Allergies:  No Known Allergies    Problem List:  There is no problem list on file for this patient.       Past Medical History:        Diagnosis Date    Arthritis     Cancer [facility])     breast    Cardiac murmur     Depression     GERD (gastroesophageal reflux disease)     Heart palpitations     with anxiety    Hyperlipidemia     Hypertension     LBBB (left bundle branch block)     Per the pt's medical record.  Migraine     PVC (premature ventricular contraction)     per the pt's medical record       Past Surgical History:        Procedure Laterality Date    BREAST LUMPECTOMY Left 10/6/2020    LEFT UPPER OUTER QUADRANT LUMPECTOMY. WITH SENTINEL LYMPH NODE BIOPSY,  AXILLARY LYMPH NODE DISSECTION performed by Barb Reynaga MD at [address] LUMPECTOMY Left 10/20/2020    REEXCISION LEFT BREAST LUMPECTOMY performed by Barb Reynaga MD at [address]      x 2     COLONOSCOPY      JOINT REPLACEMENT Bilateral     knee    US BREAST BIOPSY NEEDLE ADDITIONAL LEFT  8/6/2020    US BREAST BIOPSY NEEDLE ADDITIONAL LEFT 8/6/2020 [facility]    US BREAST NEEDLE BIOPSY LEFT  8/6/2020    US BREAST NEEDLE BIOPSY LEFT 8/6/2020 [facility]    US GUIDED NEEDLE LOC OF LEFT BREAST  10/6/2020    US GUIDED NEEDLE LOC OF LEFT BREAST 10/6/2020 [facility]       Social History:    Social History     Tobacco Use    Smoking status: Former Smoker    Smokeless tobacco: Never Used   Substance Use Topics    Alcohol use:  Yes     Alcohol/week: 7.0 standard drinks     Types: 7 Glasses of wine per week                                Counseling given: Not Answered      Vital Signs (Current):   Vitals:    11/03/20 1027 11/03/20 1035   BP: (!) 145/73    Pulse: 82    Resp: 18    Temp: 96.9 °F (36.1 °C)    TempSrc: Temporal    SpO2: 96%    Weight:  160 lb (72.6 kg)   Height:  5' 5.5\" (1.664 m)                                              BP Readings from Last 3 Encounters:   11/03/20 (!) 145/73   10/20/20 (!) 141/71   10/20/20 (!) 154/82       NPO Status: Time of last liquid consumption: 2200                        Time of last solid consumption: 2200                        Date of last liquid consumption: 11/02/20 Date of last solid food consumption: 11/02/20    BMI:   Wt Readings from Last 3 Encounters:   11/03/20 160 lb (72.6 kg)   10/20/20 174 lb (78.9 kg)   10/06/20 174 lb 2.6 oz (79 kg)     Body mass index is 26.22 kg/m². CBC:   Lab Results   Component Value Date    WBC 9.5 09/25/2020    RBC 4.37 09/25/2020    HGB 12.9 09/25/2020    HCT 42.0 09/25/2020    MCV 96.1 09/25/2020    RDW 14.4 09/25/2020     09/25/2020       CMP:   Lab Results   Component Value Date     09/25/2020    K 3.9 09/25/2020     09/25/2020    CO2 24 09/25/2020    BUN 16 09/25/2020    CREATININE 0.96 09/25/2020    GFRAA >60 09/25/2020    LABGLOM 57 09/25/2020    GLUCOSE 107 10/19/2015    GLUCOSE 103 06/01/2012    CALCIUM 9.1 10/19/2015    AST 18 04/15/2013    ALT 21 04/15/2013       POC Tests: No results for input(s): POCGLU, POCNA, POCK, POCCL, POCBUN, POCHEMO, POCHCT in the last 72 hours.     Coags: No results found for: PROTIME, INR, APTT    HCG (If Applicable): No results found for: PREGTESTUR, PREGSERUM, HCG, HCGQUANT     ABGs: No results found for: PHART, PO2ART, TXB8YOD, SGS1OTZ, BEART, X7JWUUUG     Type & Screen (If Applicable):  No results found for: LABABO, LABRH    Drug/Infectious Status (If Applicable):  No results found for: HIV, HEPCAB    COVID-19 Screening (If Applicable):   Lab Results   Component Value Date    COVID19 Not Detected 10/15/2020         Anesthesia Evaluation  Patient summary reviewed and Nursing notes reviewed no history of anesthetic complications:   Airway: Mallampati: II  TM distance: >3 FB   Neck ROM: full  Mouth opening: > = 3 FB Dental: normal exam         Pulmonary:normal exam        (-) COPD and asthma                           Cardiovascular:  Exercise tolerance: no interval change,   (+) hypertension:,     (-) pacemaker, past MI, CAD and CABG/stent    ECG reviewed    Rate: normal                    Neuro/Psych:   (+) headaches:, psychiatric history:            GI/Hepatic/Renal:   (+) GERD:,           Endo/Other:    (+) malignancy/cancer. (-) diabetes mellitus               Abdominal:           Vascular:                                        Anesthesia Plan      general     ASA 3       Induction: intravenous. Anesthetic plan and risks discussed with patient. Plan discussed with CRNA.     Attending anesthesiologist reviewed and agrees with Pre Eval content              Roberto Carlos Nixon DO   11/3/2020

## 2020-11-03 NOTE — OP NOTE
Operative Note      Patient: Jarocho Painting  YOB: 1945  MRN: [id number]    Date of Procedure: 11/3/2020    Pre-Op Diagnosis: DX LEFT BREAST CA    Post-Op Diagnosis: Same       Procedure(s):  LEFT BREAST MASTECTOMY    Surgeon(s):  Ez Mary MD    Assistant:   Resident: Lashae Robertson MD    Anesthesia: General    Estimated Blood Loss (mL): less than 50     Complications: None    Specimens:   ID Type Source Tests Collected by Time Destination   A : left breast suture marks axillary tail Tissue Breast SURGICAL PATHOLOGY Ez Mary MD 11/3/2020 1325        Implants:  * No implants in log *      Drains:   Closed/Suction Drain Left Breast (Active)       Findings: Left simple mastectomy, secondary to 2 previous attempts at lumpectomy with positive margins, previous axillary sentinel lymph node biopsy negative    Detailed Description of Procedure:   After verbal and written consent, the patient was brought to the operating room. After appropriate monitoring, general anesthesia was administered. A timeout was performed with the staff in the room confirming both the patient and the procedure to be performed. The procedure was begun with a sterile prep and drape. An elliptical incision was made with the scalpel. Dissection was carried down through the subcutaneous tissue with cautery. This incision and encompassed her prior incision and also the entire nipple and areolar complex. Dissection continued into the subcutaneous tissue. Subcutaneous flaps were raised superiorly, medially, inferiorly, and laterally to the clavicle, sternum, inframammary fold, and latissimus dorsi. There was no evidence of palpable axillary adenopathy. The breast tissue including the underlying pectoralis major muscle fascia was then excised. Hemostasis was obtained throughout the dissection with cautery. A drain was placed within the mastectomy subcutaneous cavity.   This was and sutured in place with a nylon suture and brought out through separate stab incision on the left chest wall. Interrupted Vicryl sutures were used to close the deep dermal tissue. Running V-Loc and Monocryl suture was used to close the skin. Dermabond is applied to the wounds. A sterile dressing was applied. The sponge, lap, needle, and instrument count were correct at the end of the case. Patient was awakened from anesthesia and transported to the recovery in stable condition. There were no immediate complications.     Electronically signed by Karen Nichols MD on 11/3/2020 at 2:00 PM

## 2020-11-04 VITALS
HEART RATE: 70 BPM | TEMPERATURE: 97.5 F | HEIGHT: 66 IN | RESPIRATION RATE: 16 BRPM | SYSTOLIC BLOOD PRESSURE: 122 MMHG | OXYGEN SATURATION: 97 % | WEIGHT: 160 LBS | DIASTOLIC BLOOD PRESSURE: 59 MMHG | BODY MASS INDEX: 25.71 KG/M2

## 2020-11-04 PROCEDURE — 6370000000 HC RX 637 (ALT 250 FOR IP): Performed by: STUDENT IN AN ORGANIZED HEALTH CARE EDUCATION/TRAINING PROGRAM

## 2020-11-04 PROCEDURE — G0378 HOSPITAL OBSERVATION PER HR: HCPCS

## 2020-11-04 RX ADMIN — ACETAMINOPHEN 650 MG: 325 TABLET ORAL at 05:30

## 2020-11-04 ASSESSMENT — PAIN SCALES - GENERAL: PAINLEVEL_OUTOF10: 0

## 2020-11-04 NOTE — CARE COORDINATION
Case Management Initial Discharge Plan  Reggie Rios,             Met with:patient or spouse/SO to discuss discharge plans. Information verified: address, contacts, phone number, , insurance Yes  PCP: Danielle Carcamo MD  Date of last visit:     Insurance Provider: [facility], [place]    Discharge Planning    Living Arrangements:  Spouse/Significant Other   Support Systems:  Spouse/Significant Other, Children    Home has 2 stories  2 stairs to climb to get into front door, 12 stairs to climb to reach second floor  Location of bedroom/bathroom in home  - second floor    Patient able to perform ADL's:Independent    Current Services (outpatient & in home) none  DME equipment: none  DME provider: N/A    Pharmacy: [facility]    Potential Assistance Needed:  N/A    Patient agreeable to home care: No  [place] of choice provided:  n/a    Prior SNF/Rehab Placement and Facility: [facility] to SNF/Rehab: No  [place] of choice provided: n/a   Evaluation: n/a    Expected Discharge date:  20  Patient expects to be discharged to:  HOME  Follow Up Appointment: Best Day/ Time: Monday AM    Transportation provider:   Transportation arrangements needed for discharge: No    Readmission Risk              Risk of Unplanned Readmission:        0             Does patient have a readmission risk score greater than 14?: No  If yes, follow-up appointment must be made within 7 days of discharge. Goal of Care:       Discharge Plan: Met with patient and  at bedside. Lives with , independent and drives. POD #1 lt breast mastectomy. D/C home today and post op appointment scheduled with Dr. Orlando Wong 20 at 1:20pm in [place] office for drain removal. Pt informed. No home needs anticipated.              Electronically signed by Tiffany Sorenson RN on 20 at 8:48 AM EST

## 2020-11-04 NOTE — PROGRESS NOTES
General Surgery:  Daily Progress Note                  PATIENT NAME: Kathleen Braun     TODAY'S DATE: 11/4/2020, 5:59 AM  SUBJECTIVE:     Pt seen and examined at bedside. Vitals stable. Afebrile. Patient pain well tolerated. No complaints this AM. 5cc output from MADISON drain.     OBJECTIVE:   VITALS:  /62   Pulse 65   Temp 97.9 °F (36.6 °C) (Oral)   Resp 17   Ht 5' 5.5\" (1.664 m)   Wt 160 lb (72.6 kg)   SpO2 98%   BMI 26.22 kg/m²      INTAKE/OUTPUT:      Intake/Output Summary (Last 24 hours) at 11/4/2020 0559  Last data filed at 11/4/2020 0531  Gross per 24 hour   Intake 1258 ml   Output 755 ml   Net 503 ml       PHYSICAL EXAM:  General Appearance: awake, alert, oriented, in no acute distress  HEENT:  Normocephalic, atraumatic, mucus membranes moist   Heart: Heart regular rate and rhythm  Lungs: Unlabored respirations  Abdomen: Soft, nontender  Incision: nontender, skin glue intact, MADISON in place with minimal output   Extremities: No cyanosis, pitting edema, rashes noted  Skin: Skin color, texture, turgor normal    Data:  CBC with Differential:    Lab Results   Component Value Date    WBC 9.5 09/25/2020    RBC 4.37 09/25/2020    HGB 12.9 09/25/2020    HCT 42.0 09/25/2020     09/25/2020    MCV 96.1 09/25/2020    MCH 29.5 09/25/2020    MCHC 30.7 09/25/2020    RDW 14.4 09/25/2020    LYMPHOPCT 17 09/25/2020    MONOPCT 6 09/25/2020    BASOPCT 1 09/25/2020    MONOSABS 0.61 09/25/2020    LYMPHSABS 1.63 09/25/2020    EOSABS 0.24 09/25/2020    BASOSABS 0.10 09/25/2020    DIFFTYPE NOT REPORTED 09/25/2020     BMP:    Lab Results   Component Value Date     09/25/2020    K 3.9 09/25/2020     09/25/2020    CO2 24 09/25/2020    BUN 16 09/25/2020    CREATININE 0.96 09/25/2020    CALCIUM 9.1 10/19/2015    GFRAA >60 09/25/2020    LABGLOM 57 09/25/2020    GLUCOSE 107 10/19/2015    GLUCOSE 103 06/01/2012       ASSESSMENT:  Active Hospital Problems    Diagnosis Date Noted    Primary cancer of left breast with stage 2 latisha metastasis per American Joint Committee on Cancer 7th edition (N2) [facility]) [B56.015, C77.9] 11/03/2020       1. 66 y.o. female POD#1 s/p L mastectomy    Plan:  1. General diet  2. PO pain control  3. OOB today, ambulating and use IS  4. Discharge planning - MADISON drain teaching prior to discharge    Electronically signed by Mariann Arredondo MD  on 11/4/2020 at 5:59 AM     General Surgery Attending Attestation Note    Patient was seen and examined. I agree with the above resident's exam, assessment and plan.      Doing very well  Ok to discharge home later today  Follow up with Dr. Orlando Wong in 1 week for drain removal    Jesus Singer, [address], [place], [place]  Office: [phone]  After Hours: Please call answering service

## 2020-11-05 LAB — SURGICAL PATHOLOGY REPORT: NORMAL

## 2020-11-05 NOTE — DISCHARGE SUMMARY
Surgery Discharge Summary     Patient Identification  Nohemi Fernandez is a 66 y.o. female. :  1945  Admit Date:  11/3/2020    Discharge date:   2020  9:40 AM                                   Disposition: home    Discharge Diagnoses:   Patient Active Problem List   Diagnosis    Primary cancer of left breast with stage 2 latisha metastasis per American Joint Committee on Cancer 7th edition (N2) ([address]       Condition on discharge: stable    Consults: none    Surgery: left mastectomy    Patient Instructions: Activity: no heavy lifting, pushing, pulling for 2 weeks, no driving until cleared by surgeon  Diet: As tolerated  Follow-up with Dr Renee Camarillo in 2 weeks. See pre-printed instructions in chart and given to patient upon discharge. Discharge Medications:      [facility]   Home Medication Instructions QVQ:[id number]    Printed on:20 7735   Medication Information                      aspirin 81 MG chewable tablet  Take 81 mg by mouth every other day              atorvastatin (LIPITOR) 40 MG tablet  Take 40 mg by mouth daily              HYDROcodone-acetaminophen (NORCO) 5-325 MG per tablet  Take 1 tablet by mouth every 6 hours as needed for Pain for up to 7 days. Intended supply: 7 days.  Take lowest dose possible to manage pain             lisinopril-hydrochlorothiazide (PRINZIDE;ZESTORETIC) 20-12.5 MG per tablet  Take 1 tablet by mouth daily              ondansetron (ZOFRAN) 4 MG tablet  Take 1 tablet by mouth every 8 hours as needed for Nausea or Vomiting             propranolol (INDERAL LA) 160 MG extended release capsule  Take 160 mg by mouth daily             senna (SENOKOT) 8.6 MG TABS tablet  Take 1 tablet by mouth daily             sertraline (ZOLOFT) 100 MG tablet  Take 100 mg by mouth daily             SUMAtriptan (IMITREX) 100 MG tablet  Take 100 mg by mouth once as needed                   HPI and Hospital Course:   66 y.o. female presented on 11/3/2020 for left mastectomy. Procedure was uncomplicated. Her hospital course was unremarkable. On day of discharge pt was tolerating regular diet, pain controlled with oral medications and ambulating without difficulty.       Electronically signed by Candy Vasquez MD on 11/5/2020 at 6:29 AM

## 2021-01-01 NOTE — PRE-PROCEDURE INSTRUCTIONS
[facility] ON Tuesday, October 6th at 07:00 AM    covid screening 10/2/2020 at 1:40pm    Day of surgery call [phone] and they will instruct you on what to do        Continue to take your home medications as you normally do up to and including the night before surgery with the exception of any blood thinning medications. Please stop any blood thinning medications as directed by your surgeon or prescribing physician. Failure to stop certain medications may interfere with your scheduled surgery. These may include:  Aspirin, Warfarin (Coumadin), Clopidogrel (Plavix), Ibuprofen (Motrin, Advil), Naproxen (Aleve), Meloxicam (Mobic), Celecoxib (Celebrex), Eliquis, Pradaxa, Xarelto, Effient, Fish Oil, Herbal supplements. Stop aspirin one week prior to surgery, tylenol is okay to take    If you are diabetic, do not take any of your diabetic medications by mouth the morning of surgery. If you are taking insulin contact the doctor that manages your diabetes for instructions about any changes to your insulin dosages the day before surgery. Do not inject insulin or other injectable diabetic medications the morning of surgery unless otherwise instructed by the doctor who manages your diabetes. Please take the following medication(s) the day of surgery with a small sip of water:  Propranolol,sertraline, omeprazole  Please use your inhalers at home the day of surgery. PREPARING FOR YOUR SURGERY:     Before surgery, you can play an important role in your own health. Because skin is not sterile, we need to be sure that your skin is as free of germs as possible before surgery by carefully washing before surgery. Preparing or prepping skin before surgery can reduce the risk of a surgical site infection.   Do not shave the area of your body where your surgery will be performed unless you received specific permission from your physician.     You will need to shower at home the night before surgery and the morning of surgery with a special soap called chlorhexidine gluconate (CHG*). *Not to be used by people allergic to Chlorhexidine Gluconate (CHG). Following these instructions will help you be sure that your skin is clean before surgery. Instructions on cleaning your skin before surgery: The night before your surgery:      You will need to shower with warm water (not hot) and the CHG soap.  Use a clean wash cloth and a clean towel. Have clean clothes available to put on after the shower.    First wash your hair with regular shampoo. Rinse your hair and body thoroughly to remove the shampoo. Melvin Ibanez Wash your face with your regular soap or water only. Thoroughly rinse your body with warm water from the neck down.  Turn water off to prevent rinsing the soap off too soon.  With a clean wet washcloth and half of the CHG soap in the bottle, lather your entire body from the neck down. Do not use CHG soap near your eyes or ears to avoid injury to those areas.  Wash thoroughly, paying special attention to the area where your surgery will be performed.  Wash your body gently for five (5) minutes. Avoid scrubbing your skin too hard.  Turn the water back on and rinse your body thoroughly.  Pat yourself dry with a clean, soft towel. Do not apply lotion, cream or powder.  Dress with clean freshly washed clothes. The morning of surgery:     Repeat shower following steps above - using remaining half of CHG soap in bottle. Patient Instructions:    Melvin Ibanez If you are having any type of anesthesia you are to have nothing to eat or drink after midnight the night before your surgery. This includes gum, mints, water or smoking or chewing tobacco.  The only exception to this is a small sip of water to take with any morning dose of heart, blood pressure, or seizure medications. No alcoholic beverages for 24 hours prior to surgery.      Bring your eyeglasses and case with you.  No contacts are to be worn the day of surgery. You also may bring your hearing aids. Most surgical procedures involving anesthesia will require that you remove your dentures prior to surgery.  If you are on C-PAP or Bi-PAP at home and plan on staying in the hospital overnight for your surgery please bring the machine with you. · Do not wear any jewelry or body piercings day of surgery. Also, NO lotion, perfume or deodorant to be used the day of surgery. No nail polish on the operative extremity (arm/leg surgeries)    ·   If you are staying overnight with us, please bring a small bag of personal items.  Please wear loose, comfortable clothing. If you are potentially going to have a cast or brace bring clothing that will fit over them.  In case of illness - If you have cold or flu like symptoms (high fever, runny nose, sore throat, cough, etc.) rash, nausea, vomiting, loose stools, and/or recent contact with someone who has a contagious disease (chicken pox, measles, etc.) Please call your doctor before coming to the hospital.     If your child is having surgery please make arrangements for any other children to be cared for at home on the day of surgery. Other children are not permitted in recovery room and we want you to be able to spend time with the patient. If other arrangements are not available then we suggest that you have a second adult to stay in the waiting room. Day of Surgery/Procedure:    As a patient at [place] [address] you can expect quality medical and nursing care that is centered on your individual needs. Our goal is to make your surgical experience as comfortable as possible    . Transportation After Your Surgery/Procedure: You will need a friend or family member to drive you home after your procedure.   Your  must be 25years of age or 1% lidocaine

## 2021-02-23 ENCOUNTER — TELEPHONE (OUTPATIENT)
Dept: ONCOLOGY | Age: 76
End: 2021-02-23

## 2021-04-04 NOTE — PLAN OF CARE
Patient ambulated in [place] using walker with little difficulty  No c/o dizziness, pain while ambulating, but patient does feel generally weak  Most difficult part was getting up from bed, which is when the pain occurs        Mady Rasmussen RN  04/03/21 2038 Problem: Pain:  Goal: Pain level will decrease  Description: Pain level will decrease  11/3/2020 2209 by Silas Borja RN  Outcome: Ongoing  11/3/2020 2209 by Silas Borja RN  Outcome: Ongoing  Goal: Control of acute pain  Description: Control of acute pain  11/3/2020 2209 by Silas Borja RN  Outcome: Ongoing  11/3/2020 2209 by Silas Borja RN  Outcome: Ongoing  Goal: Control of chronic pain  Description: Control of chronic pain  11/3/2020 2209 by Silas Borja RN  Outcome: Ongoing  11/3/2020 2209 by Silas Borja RN  Outcome: Ongoing  Goal: Patient's pain/discomfort is manageable  Description: Patient's pain/discomfort is manageable  Outcome: Ongoing     Problem: Infection - Surgical Site:  Goal: Will show no infection signs and symptoms  Description: Will show no infection signs and symptoms  11/3/2020 2209 by Silas Borja RN  Outcome: Ongoing  11/3/2020 2209 by Silas Borja RN  Outcome: Ongoing     Problem: Infection:  Goal: Will remain free from infection  Description: Will remain free from infection  Outcome: Ongoing     Problem: Safety:  Goal: Free from accidental physical injury  Description: Free from accidental physical injury  Outcome: Ongoing  Goal: Free from intentional harm  Description: Free from intentional harm  Outcome: Ongoing     Problem: Daily Care:  Goal: Daily care needs are met  Description: Daily care needs are met  Outcome: Ongoing     Problem: Skin Integrity:  Goal: Skin integrity will stabilize  Description: Skin integrity will stabilize  Outcome: Ongoing     Problem: Discharge Planning:  Goal: Patients continuum of care needs are met  Description: Patients continuum of care needs are met  Outcome: Ongoing

## 2021-12-28 ENCOUNTER — HOSPITAL ENCOUNTER (OUTPATIENT)
Dept: MAMMOGRAPHY | Age: 76
Discharge: HOME | End: 2021-12-30
Payer: MEDICARE

## 2021-12-28 DIAGNOSIS — Z12.31 BREAST CANCER SCREENING BY MAMMOGRAM: ICD-10-CM

## 2021-12-28 PROCEDURE — 77063 BREAST TOMOSYNTHESIS BI: CPT

## 2023-03-16 ENCOUNTER — HOSPITAL ENCOUNTER (OUTPATIENT)
Dept: MAMMOGRAPHY | Age: 78
Discharge: HOME | End: 2023-03-18
Payer: MEDICARE

## 2023-03-16 DIAGNOSIS — Z12.31 ENCOUNTER FOR SCREENING MAMMOGRAM FOR MALIGNANT NEOPLASM OF BREAST: ICD-10-CM

## 2023-03-16 PROCEDURE — 77063 BREAST TOMOSYNTHESIS BI: CPT

## (undated) DEVICE — SHEET, T, LAPAROTOMY, STERILE: Brand: MEDLINE

## (undated) DEVICE — INTENDED FOR TISSUE SEPARATION, AND OTHER PROCEDURES THAT REQUIRE A SHARP SURGICAL BLADE TO PUNCTURE OR CUT.: Brand: BARD-PARKER ® CARBON RIB-BACK BLADES

## (undated) DEVICE — APPLIER LIG CLP M L11IN TI STR RNG HNDL FOR 20 CLP DISP

## (undated) DEVICE — SUTURE V-LOC 90 3-0 L9IN ABSRB VLT L26MM V-20 1/2 CIR TAPR VLOCM0644

## (undated) DEVICE — GOWN,AURORA,NONREINFORCED,LARGE: Brand: MEDLINE

## (undated) DEVICE — ADHESIVE SKIN CLSR 0.7ML TOP DERMBND ADV

## (undated) DEVICE — TUBING, SUCTION, 1/4" X 12', STRAIGHT: Brand: MEDLINE

## (undated) DEVICE — SKIN PREP TRAY W/CHG: Brand: MEDLINE INDUSTRIES, INC.

## (undated) DEVICE — SUTURE VCRL SZ 3-0 L27IN ABSRB UD L26MM SH 1/2 CIR J416H

## (undated) DEVICE — GLOVE SURG SZ 7 CRM LTX FREE POLYISOPRENE POLYMER BEAD ANTI

## (undated) DEVICE — GOWN,AURORA,NONRNF,XL,30/CS: Brand: MEDLINE

## (undated) DEVICE — GAUZE,SPONGE,FLUFF,6"X6.75",STRL,5/TRAY: Brand: MEDLINE

## (undated) DEVICE — SURGICAL PROCEDURE KIT BASIN MAJ SET UP

## (undated) DEVICE — PREP-RESISTANT MARKER W/ RULER: Brand: MEDLINE INDUSTRIES, INC.

## (undated) DEVICE — GARMENT,MEDLINE,DVT,INT,CALF,MED, GEN2: Brand: MEDLINE

## (undated) DEVICE — DRESSING TRNSPAR W4XL10IN FLM MIC POR SURESITE 123

## (undated) DEVICE — VEST SURG SZ 3 BRK RESIST NYL ZIP VELC HK EYE CLSR W/ CUP

## (undated) DEVICE — AGENT HEMSTAT 3GM OXIDIZED REGENERATED CELOS ABSRB FOR CONT (ORDER MULTIPLES OF 5EA)

## (undated) DEVICE — GLOVE SURG SZ 75 CRM LTX FREE POLYISOPRENE POLYMER BEAD ANTI

## (undated) DEVICE — PAD,ABDOMINAL,5"X9",ST,LF,25/BX: Brand: MEDLINE INDUSTRIES, INC.

## (undated) DEVICE — TOWEL,OR,DSP,ST,BLUE,DLX,XR,4/PK,20PK/CS: Brand: MEDLINE

## (undated) DEVICE — BLANKET WRM W40.2XL55.9IN IORT LO BODY + MISTRAL AIR

## (undated) DEVICE — SUTURE MCRYL SZ 4-0 L27IN ABSRB UD L19MM PS-2 1/2 CIR PRIM Y426H

## (undated) DEVICE — DRAPE,REIN 53X77,STERILE: Brand: MEDLINE

## (undated) DEVICE — AGENT HEMSTAT W2XL4IN OXIDIZED REGENERATED CELOS ABSRB SFT

## (undated) DEVICE — GRID BX L4.65IN RADLUC FOR SAFE IMAG TRNSPRT PROC BRST SPEC

## (undated) DEVICE — YANKAUER,FLEXIBLE HANDLE,REGLR CAPACITY: Brand: MEDLINE INDUSTRIES, INC.

## (undated) DEVICE — GOWN,SIRUS,NON REINFRCD,LARGE,SET IN SL: Brand: MEDLINE

## (undated) DEVICE — PROBE COVER KIT: Brand: MEDLINE INDUSTRIES, INC.

## (undated) DEVICE — BRA SURG 4XL FOR 46 48IN 96% COT 4% SPANDEX KNIT MODERATELY

## (undated) DEVICE — ELECTRODE PT RET AD L9FT HI MOIST COND ADH HYDRGEL CORDED

## (undated) DEVICE — SPONGE LAP W18XL18IN WHT COT 4 PLY FLD STRUNG RADPQ DISP ST